# Patient Record
Sex: FEMALE | Race: BLACK OR AFRICAN AMERICAN | Employment: OTHER | URBAN - METROPOLITAN AREA
[De-identification: names, ages, dates, MRNs, and addresses within clinical notes are randomized per-mention and may not be internally consistent; named-entity substitution may affect disease eponyms.]

---

## 2020-08-09 ENCOUNTER — HOSPITAL ENCOUNTER (INPATIENT)
Age: 60
LOS: 3 days | Discharge: HOME HEALTH | DRG: 433 | End: 2020-08-12
Attending: HOSPITALIST | Admitting: FAMILY MEDICINE
Payer: MEDICARE

## 2020-08-09 DIAGNOSIS — F11.10 HEROIN ABUSE (HCC): Primary | ICD-10-CM

## 2020-08-09 PROBLEM — K72.90 LIVER FAILURE (HCC): Status: ACTIVE | Noted: 2020-08-09

## 2020-08-09 PROCEDURE — 74011250637 HC RX REV CODE- 250/637: Performed by: FAMILY MEDICINE

## 2020-08-09 PROCEDURE — 74011000250 HC RX REV CODE- 250: Performed by: FAMILY MEDICINE

## 2020-08-09 PROCEDURE — 65270000029 HC RM PRIVATE

## 2020-08-09 PROCEDURE — 74011250636 HC RX REV CODE- 250/636: Performed by: FAMILY MEDICINE

## 2020-08-09 RX ORDER — PANTOPRAZOLE SODIUM 40 MG/1
40 TABLET, DELAYED RELEASE ORAL 2 TIMES DAILY
Status: DISCONTINUED | OUTPATIENT
Start: 2020-08-09 | End: 2020-08-12 | Stop reason: HOSPADM

## 2020-08-09 RX ORDER — METHADONE HYDROCHLORIDE 10 MG/1
5 TABLET ORAL 3 TIMES DAILY
Status: DISCONTINUED | OUTPATIENT
Start: 2020-08-09 | End: 2020-08-12 | Stop reason: HOSPADM

## 2020-08-09 RX ORDER — PROMETHAZINE HYDROCHLORIDE 25 MG/1
12.5 TABLET ORAL
Status: DISCONTINUED | OUTPATIENT
Start: 2020-08-09 | End: 2020-08-09

## 2020-08-09 RX ORDER — ACETAMINOPHEN 650 MG/1
650 SUPPOSITORY RECTAL
Status: DISCONTINUED | OUTPATIENT
Start: 2020-08-09 | End: 2020-08-09

## 2020-08-09 RX ORDER — FUROSEMIDE 40 MG/1
40 TABLET ORAL DAILY
Status: DISCONTINUED | OUTPATIENT
Start: 2020-08-10 | End: 2020-08-10

## 2020-08-09 RX ORDER — LORAZEPAM 1 MG/1
1 TABLET ORAL
Status: DISCONTINUED | OUTPATIENT
Start: 2020-08-09 | End: 2020-08-12 | Stop reason: HOSPADM

## 2020-08-09 RX ORDER — SODIUM CHLORIDE 0.9 % (FLUSH) 0.9 %
5-40 SYRINGE (ML) INJECTION EVERY 8 HOURS
Status: DISCONTINUED | OUTPATIENT
Start: 2020-08-09 | End: 2020-08-12 | Stop reason: HOSPADM

## 2020-08-09 RX ORDER — SODIUM CHLORIDE 0.9 % (FLUSH) 0.9 %
5-40 SYRINGE (ML) INJECTION AS NEEDED
Status: DISCONTINUED | OUTPATIENT
Start: 2020-08-09 | End: 2020-08-12 | Stop reason: HOSPADM

## 2020-08-09 RX ORDER — POLYETHYLENE GLYCOL 3350 17 G/17G
17 POWDER, FOR SOLUTION ORAL DAILY PRN
Status: DISCONTINUED | OUTPATIENT
Start: 2020-08-09 | End: 2020-08-12 | Stop reason: HOSPADM

## 2020-08-09 RX ORDER — SPIRONOLACTONE 100 MG/1
100 TABLET, FILM COATED ORAL DAILY
Status: DISCONTINUED | OUTPATIENT
Start: 2020-08-10 | End: 2020-08-10

## 2020-08-09 RX ORDER — ONDANSETRON 2 MG/ML
4 INJECTION INTRAMUSCULAR; INTRAVENOUS
Status: DISCONTINUED | OUTPATIENT
Start: 2020-08-09 | End: 2020-08-12 | Stop reason: HOSPADM

## 2020-08-09 RX ORDER — ACETAMINOPHEN 325 MG/1
650 TABLET ORAL
Status: DISCONTINUED | OUTPATIENT
Start: 2020-08-09 | End: 2020-08-09

## 2020-08-09 RX ADMIN — Medication 10 ML: at 22:40

## 2020-08-09 RX ADMIN — LORAZEPAM 1 MG: 1 TABLET ORAL at 22:39

## 2020-08-09 RX ADMIN — CEFTRIAXONE 1 G: 1 INJECTION, POWDER, FOR SOLUTION INTRAMUSCULAR; INTRAVENOUS at 22:39

## 2020-08-09 RX ADMIN — METHADONE HYDROCHLORIDE 5 MG: 10 TABLET ORAL at 22:39

## 2020-08-09 RX ADMIN — PANTOPRAZOLE SODIUM 40 MG: 40 TABLET, DELAYED RELEASE ORAL at 22:39

## 2020-08-09 NOTE — PROGRESS NOTES
1721  TRANSFER - IN REPORT:    Verbal report received from [address], RN(name) on Evan Zamora  being received from [facility] Hemo/Oncology(unit) for routine progression of care      Report consisted of patients Situation, Background, Assessment and   Recommendations(SBAR). Information from the following report(s) SBAR, Procedure Summary, Intake/Output, MAR and Recent Results was reviewed with the receiving nurse. Opportunity for questions and clarification was provided. Laura Ellison Lavon, 62 yr old, female  Upper GI bleeding   EGD banding by Dr. Vera Avitia at [facility] on 08/05/20  Transfer for TIPS procedure at [facility]    Hx of Cirrohosis, Hepatitis C, Heroine abuse. Full code, No known allergies, Full Liquid diet. Abd soft, LBM unknown,   Skin intact, no swallowing issue,  Lung clear, SpO2 100% on room air,  NSR, bradycardic (HR 58-64), Last /67,   A&Ox4, Up & dileep, bedside commode,  PICC triple lumen, currently running Octreotide 50 mg/hr (10 mL/hr),    K 3.2 - received 30 mEq IV today,  Mg 1.5  H & H  8.6 / 25.3     Will be picked up by a transport on 1800.    1916  Pt is in unit 3S, Rm 316  JOSE MIGUEL Ellison RN is admitting pt.

## 2020-08-10 ENCOUNTER — HOSPITAL ENCOUNTER (OUTPATIENT)
Dept: ULTRASOUND IMAGING | Age: 60
Discharge: HOME | DRG: 433 | End: 2020-08-10
Attending: FAMILY MEDICINE
Payer: MEDICARE

## 2020-08-10 ENCOUNTER — APPOINTMENT (OUTPATIENT)
Dept: NON INVASIVE DIAGNOSTICS | Age: 60
DRG: 433 | End: 2020-08-10
Attending: FAMILY MEDICINE
Payer: MEDICARE

## 2020-08-10 PROBLEM — D64.9 ANEMIA: Status: ACTIVE | Noted: 2020-08-10

## 2020-08-10 PROBLEM — I85.00 ESOPHAGEAL VARICES (HCC): Status: ACTIVE | Noted: 2020-08-10

## 2020-08-10 PROBLEM — E87.6 HYPOKALEMIA: Status: ACTIVE | Noted: 2020-08-10

## 2020-08-10 PROBLEM — F11.10 HEROIN ABUSE (HCC): Status: ACTIVE | Noted: 2020-08-10

## 2020-08-10 PROBLEM — D69.6 THROMBOCYTOPENIA (HCC): Status: ACTIVE | Noted: 2020-08-10

## 2020-08-10 PROBLEM — F43.10 PTSD (POST-TRAUMATIC STRESS DISORDER): Status: ACTIVE | Noted: 2020-08-10

## 2020-08-10 PROBLEM — Z78.9 ALCOHOL USE: Status: ACTIVE | Noted: 2020-08-10

## 2020-08-10 PROBLEM — K74.60 CIRRHOSIS OF LIVER (HCC): Status: ACTIVE | Noted: 2020-08-10

## 2020-08-10 LAB
ABO + RH BLD: NORMAL
ALBUMIN SERPL-MCNC: 2.1 G/DL (ref 3.4–5)
ALBUMIN/GLOB SERPL: 0.6 {RATIO} (ref 0.8–1.7)
ALP SERPL-CCNC: 86 U/L (ref 45–117)
ALT SERPL-CCNC: 14 U/L (ref 13–56)
AMMONIA PLAS-SCNC: 55 UMOL/L (ref 11–32)
ANION GAP SERPL CALC-SCNC: 4 MMOL/L (ref 3–18)
AST SERPL-CCNC: 39 U/L (ref 10–38)
AV PEAK GRADIENT: 36.39 MMHG
AV VELOCITY RATIO: 0.56
AV VTI RATIO: 0.6
BILIRUB SERPL-MCNC: 7 MG/DL (ref 0.2–1)
BLOOD GROUP ANTIBODIES SERPL: NORMAL
BUN SERPL-MCNC: 4 MG/DL (ref 7–18)
BUN/CREAT SERPL: 5 (ref 12–20)
CALCIUM SERPL-MCNC: 7.4 MG/DL (ref 8.5–10.1)
CHLORIDE SERPL-SCNC: 106 MMOL/L (ref 100–111)
CO2 SERPL-SCNC: 29 MMOL/L (ref 21–32)
CREAT SERPL-MCNC: 0.88 MG/DL (ref 0.6–1.3)
ECHO AR MAX VEL PISA: 302 CM/S
ECHO AV ANNULUS DIAM: 2.53 CM
ECHO AV AREA PEAK VELOCITY: 1.9 CM2
ECHO AV AREA VTI: 2 CM2
ECHO AV AREA/BSA PEAK VELOCITY: 0.9 CM2/M2
ECHO AV AREA/BSA VTI: 1 CM2/M2
ECHO AV MEAN GRADIENT: 7.9 MMHG
ECHO AV MEAN VELOCITY: 1.34 M/S
ECHO AV PEAK GRADIENT: 14.5 MMHG
ECHO AV PEAK VELOCITY: 190.44 CM/S
ECHO AV REGURGITANT PHT: 1545.4 CM
ECHO AV VTI: 43.28 CM
ECHO EST RA PRESSURE: 5 MMHG
ECHO IVC PROX: 0.8 CM
ECHO LA AREA 2C: 22.08 CM2
ECHO LA AREA 4C: 27.1 CM2
ECHO LA MAJOR AXIS: 3.94 CM
ECHO LA MINOR AXIS: 1.95 CM
ECHO LA VOL 2C: 70.55 ML (ref 22–52)
ECHO LA VOL 4C: 86.35 ML (ref 22–52)
ECHO LA VOL BP: 87.61 ML (ref 22–52)
ECHO LA VOLUME INDEX A2C: 34.97 ML/M2 (ref 16–28)
ECHO LA VOLUME INDEX A4C: 42.8 ML/M2 (ref 16–28)
ECHO LV E' LATERAL VELOCITY: 8 CM/S
ECHO LV E' SEPTAL VELOCITY: 6 CM/S
ECHO LV EDV A2C: 81.3 ML
ECHO LV EDV A4C: 61.5 ML
ECHO LV EDV BP: 73.5 ML (ref 56–104)
ECHO LV EDV INDEX A4C: 30.5 ML/M2
ECHO LV EDV INDEX BP: 36.4 ML/M2
ECHO LV EDV NDEX A2C: 40.3 ML/M2
ECHO LV EDV TEICHHOLZ: 0.5 ML
ECHO LV EJECTION FRACTION A2C: 69 %
ECHO LV EJECTION FRACTION A4C: 69 %
ECHO LV EJECTION FRACTION BIPLANE: 69.2 % (ref 55–100)
ECHO LV ESV A2C: 25.6 ML
ECHO LV ESV A4C: 19.1 ML
ECHO LV ESV BP: 22.7 ML (ref 19–49)
ECHO LV ESV INDEX A2C: 12.7 ML/M2
ECHO LV ESV INDEX A4C: 9.5 ML/M2
ECHO LV ESV INDEX BP: 11.3 ML/M2
ECHO LV ESV TEICHHOLZ: 0.19 ML
ECHO LV INTERNAL DIMENSION DIASTOLIC: 4.45 CM (ref 3.9–5.3)
ECHO LV INTERNAL DIMENSION SYSTOLIC: 2.96 CM
ECHO LV IVSD: 1.3 CM (ref 0.6–0.9)
ECHO LV MASS 2D: 172 G (ref 67–162)
ECHO LV MASS INDEX 2D: 85.2 G/M2 (ref 43–95)
ECHO LV POSTERIOR WALL DIASTOLIC: 0.9 CM (ref 0.6–0.9)
ECHO LV POSTERIOR WALL SYSTOLIC: 0 CM
ECHO LVOT CARDIAC OUTPUT: 16.1 L/MIN
ECHO LVOT DIAM: 2.1 CM
ECHO LVOT PEAK GRADIENT: 4.5 MMHG
ECHO LVOT PEAK VELOCITY: 106.22 CM/S
ECHO LVOT SV: 86 ML
ECHO LVOT VTI: 24.88 CM
ECHO MV A VELOCITY: 111.4 CM/S
ECHO MV AREA PHT: 3.2 CM2
ECHO MV E DECELERATION TIME (DT): 233.5 MS
ECHO MV E VELOCITY: 90.38 CM/S
ECHO MV E/A RATIO: 0.81
ECHO MV E/E' LATERAL: 11.3
ECHO MV E/E' RATIO (AVERAGED): 13.18
ECHO MV E/E' SEPTAL: 15.06
ECHO MV PRESSURE HALF TIME (PHT): 67.7 MS
ECHO RA AREA 4C: 18.12 CM2
ECHO RA VOLUME: 50 ML
ECHO RV INTERNAL DIMENSION: 3.06 CM
ECHO RV TAPSE: 2.5 CM (ref 1.5–2)
ECHO TV REGURGITANT MAX VELOCITY: 240.74 CM/S
ECHO TV REGURGITANT PEAK GRADIENT: 23.2 MMHG
ERYTHROCYTE [DISTWIDTH] IN BLOOD BY AUTOMATED COUNT: 23.6 % (ref 11.6–14.5)
GLOBULIN SER CALC-MCNC: 3.8 G/DL (ref 2–4)
GLUCOSE SERPL-MCNC: 110 MG/DL (ref 74–99)
HCT VFR BLD AUTO: 25.3 % (ref 35–45)
HGB BLD-MCNC: 8 G/DL (ref 12–16)
HIV 1+2 AB+HIV1 P24 AG SERPL QL IA: NONREACTIVE
HIV12 RESULT COMMENT, HHIVC: NORMAL
INR PPP: 1.9 (ref 0.8–1.2)
LVFS 2D: 33.58 %
LVOT MG: 2.32 MMHG
LVOT MV: 0.7 CM/S
LVSV (MOD BI): 24.26 ML
LVSV (MOD SINGLE 4C): 20.19 ML
LVSV (MOD SINGLE): 26.56 ML
LVSV (TEICH): 26.83 ML
MCH RBC QN AUTO: 28.2 PG (ref 24–34)
MCHC RBC AUTO-ENTMCNC: 31.6 G/DL (ref 31–37)
MCV RBC AUTO: 89.1 FL (ref 74–97)
MV DEC SLOPE: 3.87
PISA AR MAX VEL: 301.6 CM/S
PLATELET # BLD AUTO: 27 K/UL (ref 135–420)
POTASSIUM SERPL-SCNC: 3.6 MMOL/L (ref 3.5–5.5)
PROT SERPL-MCNC: 5.9 G/DL (ref 6.4–8.2)
PROTHROMBIN TIME: 21.3 SEC (ref 11.5–15.2)
RBC # BLD AUTO: 2.84 M/UL (ref 4.2–5.3)
SODIUM SERPL-SCNC: 139 MMOL/L (ref 136–145)
SPECIMEN EXP DATE BLD: NORMAL
WBC # BLD AUTO: 3.6 K/UL (ref 4.6–13.2)

## 2020-08-10 PROCEDURE — 85027 COMPLETE CBC AUTOMATED: CPT

## 2020-08-10 PROCEDURE — 76978 US TRGT DYN MBUBB 1ST LES: CPT

## 2020-08-10 PROCEDURE — 74011250637 HC RX REV CODE- 250/637: Performed by: FAMILY MEDICINE

## 2020-08-10 PROCEDURE — 82140 ASSAY OF AMMONIA: CPT

## 2020-08-10 PROCEDURE — 74011250636 HC RX REV CODE- 250/636: Performed by: FAMILY MEDICINE

## 2020-08-10 PROCEDURE — 85610 PROTHROMBIN TIME: CPT

## 2020-08-10 PROCEDURE — 36430 TRANSFUSION BLD/BLD COMPNT: CPT

## 2020-08-10 PROCEDURE — 65270000029 HC RM PRIVATE

## 2020-08-10 PROCEDURE — 93306 TTE W/DOPPLER COMPLETE: CPT

## 2020-08-10 PROCEDURE — 30233N1 TRANSFUSION OF NONAUTOLOGOUS RED BLOOD CELLS INTO PERIPHERAL VEIN, PERCUTANEOUS APPROACH: ICD-10-PCS | Performed by: HOSPITALIST

## 2020-08-10 PROCEDURE — 80053 COMPREHEN METABOLIC PANEL: CPT

## 2020-08-10 PROCEDURE — 86900 BLOOD TYPING SEROLOGIC ABO: CPT

## 2020-08-10 PROCEDURE — 87389 HIV-1 AG W/HIV-1&-2 AB AG IA: CPT

## 2020-08-10 PROCEDURE — P9047 ALBUMIN (HUMAN), 25%, 50ML: HCPCS | Performed by: FAMILY MEDICINE

## 2020-08-10 PROCEDURE — P9035 PLATELET PHERES LEUKOREDUCED: HCPCS

## 2020-08-10 PROCEDURE — 74011000250 HC RX REV CODE- 250: Performed by: FAMILY MEDICINE

## 2020-08-10 PROCEDURE — 87902 NFCT AGT GNTYP ALYS HEP C: CPT

## 2020-08-10 PROCEDURE — 87522 HEPATITIS C REVRS TRNSCRPJ: CPT

## 2020-08-10 RX ORDER — ALBUMIN HUMAN 250 G/1000ML
25 SOLUTION INTRAVENOUS EVERY 6 HOURS
Status: DISCONTINUED | OUTPATIENT
Start: 2020-08-10 | End: 2020-08-11

## 2020-08-10 RX ORDER — SPIRONOLACTONE 100 MG/1
200 TABLET, FILM COATED ORAL DAILY
Status: DISCONTINUED | OUTPATIENT
Start: 2020-08-11 | End: 2020-08-12 | Stop reason: HOSPADM

## 2020-08-10 RX ORDER — FUROSEMIDE 40 MG/1
80 TABLET ORAL DAILY
Status: DISCONTINUED | OUTPATIENT
Start: 2020-08-11 | End: 2020-08-12 | Stop reason: HOSPADM

## 2020-08-10 RX ORDER — SODIUM CHLORIDE 9 MG/ML
250 INJECTION, SOLUTION INTRAVENOUS AS NEEDED
Status: DISCONTINUED | OUTPATIENT
Start: 2020-08-10 | End: 2020-08-12 | Stop reason: HOSPADM

## 2020-08-10 RX ADMIN — METHADONE HYDROCHLORIDE 5 MG: 10 TABLET ORAL at 10:17

## 2020-08-10 RX ADMIN — ALBUMIN (HUMAN) 25 G: 0.25 INJECTION, SOLUTION INTRAVENOUS at 21:00

## 2020-08-10 RX ADMIN — ALBUMIN (HUMAN) 25 G: 0.25 INJECTION, SOLUTION INTRAVENOUS at 10:11

## 2020-08-10 RX ADMIN — SULFUR HEXAFLUORIDE 2.4 ML: KIT at 13:00

## 2020-08-10 RX ADMIN — Medication 10 ML: at 21:00

## 2020-08-10 RX ADMIN — FUROSEMIDE 40 MG: 40 TABLET ORAL at 10:17

## 2020-08-10 RX ADMIN — METHADONE HYDROCHLORIDE 5 MG: 10 TABLET ORAL at 21:00

## 2020-08-10 RX ADMIN — PANTOPRAZOLE SODIUM 40 MG: 40 TABLET, DELAYED RELEASE ORAL at 10:17

## 2020-08-10 RX ADMIN — Medication 10 ML: at 14:06

## 2020-08-10 RX ADMIN — ALBUMIN (HUMAN) 25 G: 0.25 INJECTION, SOLUTION INTRAVENOUS at 15:57

## 2020-08-10 RX ADMIN — CEFTRIAXONE 1 G: 1 INJECTION, POWDER, FOR SOLUTION INTRAMUSCULAR; INTRAVENOUS at 21:00

## 2020-08-10 RX ADMIN — PANTOPRAZOLE SODIUM 40 MG: 40 TABLET, DELAYED RELEASE ORAL at 20:59

## 2020-08-10 RX ADMIN — SPIRONOLACTONE 100 MG: 100 TABLET ORAL at 10:17

## 2020-08-10 RX ADMIN — LORAZEPAM 1 MG: 1 TABLET ORAL at 21:11

## 2020-08-10 RX ADMIN — METHADONE HYDROCHLORIDE 5 MG: 10 TABLET ORAL at 15:56

## 2020-08-10 RX ADMIN — ALBUMIN (HUMAN) 25 G: 0.25 INJECTION, SOLUTION INTRAVENOUS at 03:27

## 2020-08-10 NOTE — ROUTINE PROCESS
1912  Bedside and Verbal shift change report given to Darrell Acosta RN (oncoming nurse) by Ra Doshi RN (offgoing nurse). Report included the following information SBAR, Kardex and MAR.

## 2020-08-10 NOTE — PROGRESS NOTES
8932   Pt is awake, alert, oriented x4. Seen by Dr Zulema Rodriguez. Echo   being done after Dr Zulema Rodriguez left. As per MD, pt can resume regular diet. Pt will have US of abdomen and/or Paracentecis late in the afternoon. Will let RN know if pt will need to be NPO.  1024   Pt ambulated in BR. Lungs are clear. Abdomen slightly distended with + BS.    1134   IDR done . 1219   1 unit Platelets started  For platelet level 27. Pt went down for US abd limited and Paracentesis. via bed accompanied by Tony Ramsey RN to check on platelets infusion. .  RN gave report regarding Platelet transfusion to Tony Ramsey RN. .   1345   Ultrasound of abd was done. Pt did not have enough fluids to  Do a Paracentecis so procedure was cancelled. Pt  Is back to room via bed. Platelets transfusion in progress. 1403   Pt's Transfusion  completed without any untoward reaction. 1602   Pt is awake, alert, oriented x4. Resting in bed. Pt has pain in her abdomen  With pain level 5/10. Took scheduled dose of Methadone. 1800   Eating dinner.

## 2020-08-10 NOTE — ROUTINE PROCESS
Bedside and Verbal shift change report given to Jim Arechiga RN (oncoming nurse) by F. Devota Hashimoto, RN (offgoing nurse). Report included the following information SBAR, Kardex, Intake/Output, MAR and Recent Results.

## 2020-08-10 NOTE — PROGRESS NOTES
Accompanied pt to and from ultrasound dept to monitor ongoing platelet transfusion during radiology exam. Pt returned to inpt room in no acute distress and without changes to baseline status. Tahira Winn RN assumed care and platelet transfusion. Statement Selected

## 2020-08-10 NOTE — H&P
History & Physical    Patient: Homero Benito MRN: [id number]  CSN: [id number]    YOB: 1960  Age: 62 y.o. Sex: female      DOA: 8/9/2020  Primary Care Provider:  Other, MD Gretchen      Assessment/Plan     Patient Active Problem List   Diagnosis Code    Liver failure ([facility] K72.90    Anemia D64.9    Thrombocytopenia ([facility] D69.6    Esophageal varices (HCC) I85.00    Hepatitis C B19.20    Alcohol use Z72.89    Heroin abuse ([facility] F11.10    Cirrhosis of liver ([facility] K74.60    PTSD (post-traumatic stress disorder) F43.10    Hypokalemia E87.6     63 y/o female with cirrhosis due to hepatitis C and EtOH use, liver failure, and esophageal varices transferred from [place] for TIPS procedure and hepatology consultation.     Liver failure-MELD score of 17.2 based on last [place] labs  -hepatology consult (Dr. Jaimie Barker aware of transfer)  -lasix 40 mg daily  -spironolactone 100 mg daily  -albumin infusions  -plan for TIPS procedure  -echo in the morning to evaluate EF  -check ammonia level  -continue rocephin 1 g daily for SBP prophylaxis  -RUQ ultrasound (there was mention of cholelithiasis with stones near the gallbladder neck on abdominal CT)    Esophageal varices-s/p banding at [place]  -monitor H/H  -will need screening EGD as per hepatology  -protonix BID    Thrombocytopenia-due to liver failure  -avoid anticoagulation  -follow CBC    Hepatitis C-likely due to IV drug use, has not received treatment  -check hepatitis C viral load  -check HIV test    Cirrhosis of liver-due to hepatitis C and EtOH use    Hypokalemia  -monitor K closely    Heroin abuse (intranasal)-recommend complete abstinence  -methadone TID    Alcohol abuse-recommend complete abstinence    Anemia-stable post variceal banding at [place]  -trend H/H    Full code    Prophylaxis: SCDs, protonix IV, no AC due to thrombocytopenia    Estimated length of stay : 5 nights    [facility] MD Sanchez  ---------------------------------------------------------------------------------------------------------------------------------------------------------  CC: transfer for TIPS/hepatology       HPI:     Homero Benito is a 62 y.o. female who has ongoing intranasal heroin abuse and EtOH use (one beer per week) who was treated at the [place] last week for a CHF exacerbation (she had swelling in her legs and shortness of breath). After she was discharged, she developed acute upper GI bleeding and was admitted to [facility], where she was found to be in liver failure. She did not have paracentesis during her admission there. She was transferred to THE [facility] for TIPS procedure and hepatology care. She denies chest pain, shortness of breath, or bleeding at this time. Per [place] discharge summary 8/9/20:  Hospitalization course:  Mrs. Doll was admitted to [facility] under the impression of acute upper GI bleed secondary to esophageal varices,decompensated cirrhosis and thrombocytopenia. She was seen and evaluated by Gastroenterology and underwent an EGD with banding EGD demonstrated varices with red David signs, mild [place] gastropathy, duodenal atrophy. The patient has required p.r.n. platelets and 2 units of PRBCs throughout her hospitalization. In light of the fact that she needs a tips procedure she be transferred to a facility capable of performing the procedure. Past Medical History:   Diagnosis Date    Alcohol use     Bipolar 2 disorder ([facility]     Hepatitis C     Heroin abuse ([facility]     PTSD (post-traumatic stress disorder)        History reviewed. No pertinent surgical history.     Family History   Problem Relation Age of Onset    Heart Disease Mother     Cancer Father        Social History     Socioeconomic History    Marital status: SINGLE     Spouse name: Not on file    Number of children: Not on file    Years of education: Not on file    Highest education level: Not on file   Other Topics Concern       Prior to Admission medications    Not on File       No Known Allergies    Review of Systems  Gen: No fever, chills, malaise, +weight gain due to fluid. Heent: No headache, rhinorrhea, sore throat. Heart: No chest pain, palpitations, VELASQUEZ, pnd, or orthopnea. Resp: No cough, hemoptysis, wheezing and shortness of breath. GI: No nausea, vomiting, diarrhea, constipation, melena or hematochezia. : No urinary obstruction, dysuria or hematuria. Derm: No rash, new skin lesion or pruritis. Musc/skeletal: no bone or joint complaints. Vasc: No edema, cyanosis or claudication. Endo: No heat/cold intolerance, no polyuria,polydipsia or polyphagia. Neuro: No unilateral weakness, numbness, tingling. No seizures. Heme: +recent bleeding          Physical Exam:     Physical Exam:  Visit Vitals  /61 (BP 1 Location: Left arm, BP Patient Position: At rest)   Pulse 64   Temp 98.2 °F (36.8 °C)   Resp 16   Wt 88.4 kg (194 lb 12.8 oz)   SpO2 98%           Temp (24hrs), Av.2 °F (36.8 °C), Min:98.1 °F (36.7 °C), Max:98.2 °F (36.8 °C)    No intake/output data recorded. No intake/output data recorded. General:  Awake, cooperative, no distress. Head:  Normocephalic, without obvious abnormality, atraumatic. Eyes:  Conjunctivae/corneas clear, sclera icteric. Neck: Supple, symmetrical, trachea midline. Lungs:   Clear to auscultation bilaterally. Heart:  Regular rate and rhythm, S1, S2 normal, no murmur, click, rub or gallop. Abdomen: Soft, distended, tender to palpation in the epigastric region. No rebound tenderness. Bowel sounds normal.   Extremities: Extremities normal, atraumatic, no cyanosis or edema. Capillary refill normal.   Pulses: 2+ and symmetric all extremities. Skin: Skin color pink, turgor normal. No rashes or lesions   Neurologic: No focal motor or sensory deficit.        Labs Reviewed: see care everywhere for [facility] labs    All lab results for the last 24 hours reviewed. No results found for this or any previous visit (from the past 24 hour(s)).

## 2020-08-10 NOTE — PROGRESS NOTES
Hospitalist Progress Note    Patient: Bridger Can MRN: [id number]  CSN: [id number]    YOB: 1960  Age: 62 y.o. Sex: female    DOA: 8/9/2020 LOS:  LOS: 1 day          Chief Complaint:    Liver failure      Assessment/Plan     Liver failure  Appreciate hepatology consult  She has cirrhosis from hep C and likely etoh abuse  She also abuses heroin intransally  At this juncture TIPS not recommended, no lactulose as not encephalopathic, and Liver US will be done  Hep C tx options in discussion    Esophageal varices-s/p banding at [facility] daily  Repeat EGD next month per hepatology  PO protonix BID     Thrombocytopenia-due to liver failure  No blood thinners  Platelet transfusion today    Coagulopathy due to liver cirrhosis     Hepatitis C-likely due to immunizations in 1970's per hepatology     Cirrhosis of liver-due to hepatitis C and EtOH use     Heroin abuse (intranasal)-counselled dangers of continued use  methadone TID     Alcohol abuse-CIWA protocol, but has not drank since being hospitalized now over 5 days     Anemia-stable post variceal banding at [facility] she can d/c soon if no interventions planned outside of today    Disposition :  Patient Active Problem List   Diagnosis Code    Liver failure ([facility] K72.90    Anemia D64.9    Thrombocytopenia ([facility] D69.6    Esophageal varices (HCC) I85.00    Hepatitis C B19.20    Alcohol use Z72.89    Heroin abuse ([facility] F11.10    Cirrhosis of liver ([facility] K74.60    PTSD (post-traumatic stress disorder) F43.10    Hypokalemia E87.6       Subjective:    I am ok  Denies shakes, tremors  Denies nausea.  vomiting    Review of systems:    Constitutional: denies fevers, chills  Respiratory: denies SOB, cough  Cardiovascular: denies chest pain, palpitations  Gastrointestinal: denies  diarrhea      Vital signs/Intake and Output:  Visit Vitals  /61 (BP 1 Location: Left arm, BP Patient Position: At rest)   Pulse 67   Temp 98.3 °F (36.8 °C)   Resp 16   Wt 88.4 kg (194 lb 12.8 oz)   SpO2 98%     Current Shift:  No intake/output data recorded. Last three shifts:  No intake/output data recorded. Exam:    General: elderly appearing AAF alert, NAD, OX3  Head/Neck: scleral icterus  CVS:Regular rate and rhythm, no M/R/G, S1/S2 heard, no thrill  Lungs:Clear to auscultation bilaterally, no wheezes, rhonchi, or rales  Abdomen: Soft, Nontender, No distention, Normal Bowel sounds, No hepatomegaly  Extremities: No C/C/E, pulses palpable 2+  Neuro:grossly normal , follows commands  Psych:appropriate                Labs: Results:       Chemistry Recent Labs     08/10/20  0415   *      K 3.6      CO2 29   BUN 4*   CREA 0.88   CA 7.4*   AGAP 4   BUCR 5*   AP 86   TP 5.9*   ALB 2.1*   GLOB 3.8   AGRAT 0.6*      CBC w/Diff Recent Labs     08/10/20  0415   WBC 3.6*   RBC 2.84*   HGB 8.0*   HCT 25.3*   PLT 27*      Cardiac Enzymes No results for input(s): CPK, CKND1, ABIOLA in the last 72 hours. No lab exists for component: CKRMB, TROIP   Coagulation Recent Labs     08/10/20  0415   PTP 21.3*   INR 1.9*       Lipid Panel No results found for: CHOL, CHOLPOCT, CHOLX, CHLST, CHOLV, 150686, HDL, HDLP, LDL, LDLC, DLDLP, 498677, VLDLC, VLDL, TGLX, TRIGL, TRIGP, TGLPOCT, CHHD, CHHDX   BNP No results for input(s): BNPP in the last 72 hours.    Liver Enzymes Recent Labs     08/10/20  0415   TP 5.9*   ALB 2.1*   AP 86      Thyroid Studies No results found for: T4, T3U, TSH, TSHEXT     Procedures/imaging: see electronic medical records for all procedures/Xrays and details which were not copied into this note but were reviewed prior to creation of Jemima Ribera MD

## 2020-08-10 NOTE — PROGRESS NOTES
2300-Verbal report received from [facility]. Sbar, mar, labs, kardex and patient status reviewed. Per report patient appropriate for medical and does not need ICU level care. Noted no order to transfer to ICU. Called Dr. Juan Stone to clarify patient placement without transfer orders to ICU.     Kirtjames Carolann returned page. Pt to stay on medical unit.

## 2020-08-10 NOTE — PROGRESS NOTES
Problem: Falls - Risk of  Goal: *Absence of Falls  Description: Document Anh Cordova Fall Risk and appropriate interventions in the flowsheet.   Outcome: Progressing Towards Goal  Note: Fall Risk Interventions:  Mobility Interventions: Utilize walker, cane, or other assistive device, Patient to call before getting OOB    Mentation Interventions: Door open when patient unattended    Medication Interventions: Patient to call before getting OOB

## 2020-08-10 NOTE — PROGRESS NOTES
2001 - Head to toe assessment completed at this time. Pt denies any chest pain or SOB. Pt denies any unusual numbness or tingling to extremities. Pt skin intact and bowel sounds are hypoactive. Pt lungs sounds clear bilaterally and  equal bilaterally. Pt denies any chest pain and is alert and oriented times four. Pt armband applied at this time and educated on the use of the call light. Pt encouraged to call for assistance with the use of the restroom. Pt left in bed with call light within reach, bed in the low position, and wheels locked. Will continue to monitor. 2320 - Informed that pt will remain on the unit and will not be transferred. 2261 - Critical lab results with platelets being 27.    0502 - Dr. Juliana Sanderson informed of platelets and stated that she would or transfusion. Shift summary - Pt had an uneventful night.

## 2020-08-10 NOTE — ROUTINE PROCESS
2300 - TRANSFER - OUT REPORT:    Verbal report given to KESHA Okeefe RN(name) on Pema Mask  being transferred to ICU(unit) for routine progression of care       Report consisted of patients Situation, Background, Assessment and   Recommendations(SBAR). Information from the following report(s) SBAR, Kardex, Intake/Output, MAR and Recent Results was reviewed with the receiving nurse. Lines:       Opportunity for questions and clarification was provided.       Patient transported with:   Registered Nurse

## 2020-08-10 NOTE — PROGRESS NOTES
Problem: Falls - Risk of  Goal: *Absence of Falls  Description: Document Katty Nix Fall Risk and appropriate interventions in the flowsheet.   Outcome: Progressing Towards Goal  Note: Fall Risk Interventions:  Mobility Interventions: Utilize walker, cane, or other assistive device, Patient to call before getting OOB         Medication Interventions: Assess postural VS orthostatic hypotension, Patient to call before getting OOB, Teach patient to arise slowly

## 2020-08-10 NOTE — CONSULTS
[address], MD, Tj, Pippa Bolaños, MD John Bradford, NORMA Law, [facility]     Davina Novak, [facility]   Doug Miller, [facility]    Joe Corrales, [facility]       Zonia Biggs Andrew De Nath 136    at [address], [address] [place] [address], Rafaela  22.    [phone]    FAX: [address] [place] [address], [address]    [phone]    FAX: [phone]         HEPATOLOGY CONSULT NOTE  I was asked to see this patient in consultation by Dr Dilia Alvarez for management of cirrhosis and ascites. I have reviewed the Emergency room note, Hospital admission note, Notes by all other physicians who have seen the patient during this hospitalization to date. I have reviewed the problem list and the reason for this hospitalization. I have reviewed the allergies and the medications the patient was taking at home prior to this hospitalization. HISTORY:  The patient is a 62year old female with cirrhosis secondary to chronic HCV. She probably acquired HCV from mass vaccination after enrolling in the [employer] in the 1970s. She was found to have HCV in the 1990s. She was never treated for HCV. She found out she had cirrhosis 1 week ago when she had hematemesis and She was taken to [facility] and underwent EGD with banding of esophageal varices. She developed ascites and HE    I was called about her on Thursday with request to transfer for emergency TIPS. [facility] and she was finally transfered yesterday. IN her hospital room this AM she is alert and oriented. Some ascites is present. There is no edema.   Labs are significnat for TBILI 7.0, INR 1.9, Scr 0.88, Sna 139, HB 8.0, PLT 27,      PMH:  Knee surgery ASSESSMENT AND PLAN:  Cirrhosis  Cirrhosis is secondary to chronic HCV. The diagnosis of cirrhosis is based upon imaging, laboratory studies, complications of cirrhosis. The CTP is 11. Child class C. The MELD score is 21. Chronic HCV   Chronic HCV with cirrhosis. She probably acquired HCV in the 1970s with mass vaccination when she enlisted in the Counts include 234 beds at the [facility]. She found out she had HCV in the 1990s. She was never treated for HCV. She found out she had cirrhosis in 8/2020    Liver transaminases are normal.  ALP is normal.  Total bilirubin is elevated. Serum albumin is depressed. INR is prolonged. The platelet count is depressed. The last HCV RNA was log 5.12 intU in 5/2019. HCV genotype has not been defined. Serologic testing for HBV is negative. Will perform imaging of the liver with ultrasound and duplex of HV and PV. Chronic HCV Treatment  The patient has not been treated for HCV. The patient has HCV genotype that is not yet defined. Discussed the treatment alternatives. The SVR/cure rate for HCV now exceeds 97% without significant side effects for most patients with HCV. The specific treatment is dependent upon genotype, viral load and histology. The patient could be treated with Epclusa (sofosbuvir and valpitasvir) for 12 weeks. The SVR/cure rate for is about 90% because she has decompensated cirrhosis. Alternatively she has a MELD score of 21 and if she is a candidate for liver transplant she could be treated for HCV after the liver transplant. THis would increase her chance for SVR/cayetano to 98%,    Ascites   Ascites has developed for thje first time in 8/2020 after variceal bleeding. Ascites is still present on the current dose of diuretics step 1. Scr and Sna are normal and so we will increase diuretics to step 2. There is no indication for TIPS at this time. Paracentesis will be perfomred today. Analyze ascites for albumin and cell count.     The patient was counseled regarding the need to maintain sodium restriction and the types of foods containing high amounts of sodium to be avoided. Lower extremity edema  Edema has either resolved on the current dose of diuretics step 1 or never developed prior to transfer. Screening for Esophageal varices   The patient has esophageal varices with prior bleeding. Varices were banded in 8/2020. Will schedule for repeat EGD to assess for varices and need for additional banding in 9/2020  Do not start beta-blocker because this is associated with an increased risk of CARMINA and HRS in patients with ascites. Hepatic encephalopathy   It is not cler if the patient had HE after the bleed. She is currently alert and oriented with only mild elevation of NH3 on no lactulose or xifaxan. There is no need for treatment with lactulose and/or Xifaxan at this time. There is no need to restrict dietary protein at this time. Anemia   This is due to multifactorial causes including recent GI bleeding and acute blood loss,   portal hypertension with chronic GI blood loss    The most recent FE studies were from 8/2020. The serum ferritin is 167  The FE saturation is 74%  These results probably unreliable because of transfusion the day prior. She likely has Fe deficiency and will need IV Fe as out-patient    Thrombocytopenia   This is secondary to cirrhosis. There is no evidence of overt bleeding at this time  No treatment is required. The platelet count is under 50K. The use of a platelet growth factor to raise the platelet count and avoid platelet transfusions would be indicated if the patient requires a medical or surgical procedure. SYSTEM REVIEW:  Constitution systems: Negative for fever, chills, weight gain, weight loss. Eyes: Negative for visual changes. ENT: Negative for sore throat, painful swallowing. Respiratory: Negative for cough, hemoptysis, SOB.    Cardiology: Negative for chest pain, palpitations. GI:  Negative for constipation or diarrhea. : Negative for urinary frequency, dysuria, hematuria, nocturia. Skin: Negative for rash. Hematology: Negative for easy bruising, blood clots. Musculo-skelatal: Negative for back pain, muscle pain, weakness. Neurologic: Negative for headaches, dizziness, vertigo, memory problems not related to HE. Psychology: Negative for anxiety, depression. FAMILY HISTORY:  The father  of lung cancer. The mother  of CHF. There is no family history of liver disease. SOCIAL HISTORY:  The patient has never been . The patient has no children. The patient currently smokes half pack of tobacco daily. The patient consumes 1 alcoholic beverage/beer per week. She says she has never consumed more than 1-2 alcohol drinks daily. The patient used to work at [employer] PHYSICAL EXAMINATION:  VS: per nursing note  General:  No acute distress. Eyes:  Sclera icteric  ENT:  No oral lesions. Thyroid normal.  Nodes:  No adenopathy. Skin:  No spider angiomata. No jaundice. Respiratory:  Lungs clear to auscultation. Cardiovascular:  Regular heart rate. Abdomen:  Soft non-tender, Ascites appears to be present. Extremities:  No lower extremity edema. Muscle wasting. Neurologic:  Alert and oriented. Cranial nerves grossly intact. No asterixis. LABORATORY:  Results for Rachel Patel (MRN [id number]) as of 8/10/2020 07:05   Ref.  Range 8/10/2020 04:15   WBC Latest Ref Range: 4.6 - 13.2 K/uL 3.6 (L)   HGB Latest Ref Range: 12.0 - 16.0 g/dL 8.0 (L)   PLATELET Latest Ref Range: 135 - 420 K/uL 27 (LL)   INR Latest Ref Range: 0.8 - 1.2   1.9 (H)   Sodium Latest Ref Range: 136 - 145 mmol/L 139   Potassium Latest Ref Range: 3.5 - 5.5 mmol/L 3.6   Chloride Latest Ref Range: 100 - 111 mmol/L 106   CO2 Latest Ref Range: 21 - 32 mmol/L 29   Glucose Latest Ref Range: 74 - 99 mg/dL 110 (H)   BUN Latest Ref Range: 7.0 - 18 MG/DL 4 (L)   Creatinine Latest Ref Range: 0.6 - 1.3 MG/DL 0.88   Bilirubin, total Latest Ref Range: 0.2 - 1.0 MG/DL 7.0 (H)   Albumin Latest Ref Range: 3.4 - 5.0 g/dL 2.1 (L)   ALT Latest Ref Range: 13 - 56 U/L 14   AST Latest Ref Range: 10 - 38 U/L 39 (H)   Alk. phosphatase Latest Ref Range: 45 - 117 U/L 86   Ammonia Latest Ref Range: 11 - 32 UMOL/L 55 (H)       RADIOLOGY:  8/2020. CT scan abdomen with IV contrast.  Hepatomegaly and splenomegaly. No liver mass lesions. No dilated bile ducts. Moderate ascites. 8/2020. Ultrasound of liver. No ascites.       Konstantin Luong MD  [address]  [address] [place] [address], [address]  12 [facility]

## 2020-08-10 NOTE — PROGRESS NOTES
1838 Margarita Jamil Email sent to Federico Stephy Kita Hurst. @[employer] regarding insurance. 3801 cm met with pt at bedside ,introduced self and explained cm role as d/c planner, when discharged pt plans to return back home with her roommates, cm discussed self pay status pt states she has medicare part A and veterans insurance, pt stated she is 70 % vested with [employer], pt uses [facility] as pcp [phone] pt doesn't recall names stated her psych physician practice from same clinic, pt takes prozac for her bipolar and PTSD has enough meds at home, [employer] explained pt selected to transfer form signed by pt cms will fax to appropriate number, when discharged pt plans on contacting her friend Jaleesa Schwarz for transportation, pt usually takes public transportation to doctors appointments, pt denies using any home DME's independent with care. Reason for Admission:   Transferred to [facility] from [place] for TIPS procedure                   RUR Score:  moderate                PCP: First and Last name:     Name of Practice:    Are you a current patient: Yes/No:    Approximate date of last visit:    Can you participate in a virtual visit if needed:     Do you (patient/family) have any concerns for transition/discharge? Pt listed as self pay              Plan for utilizing home health:  TBD     Current Advanced Directive/Advance Care Plan:  Not on chart pastoral services available if needed. Transition of Care Plan:    Chart reviewed noted pt transferred from [place] for TIPS procedure,hx includes hepatitis C,ETOH,cirrhosis,liver failure,  on case, cm will verify self pay status with pt and will notify [employer] if needed. Care Management Interventions  PCP Verified by CM: Yes  Palliative Care Criteria Met (RRAT>21 & CHF Dx)?: No  Current Support Network:  Other

## 2020-08-11 LAB
ALBUMIN SERPL-MCNC: 3.1 G/DL (ref 3.4–5)
ALBUMIN/GLOB SERPL: 0.9 {RATIO} (ref 0.8–1.7)
ALP SERPL-CCNC: 79 U/L (ref 45–117)
ALT SERPL-CCNC: 16 U/L (ref 13–56)
ANION GAP SERPL CALC-SCNC: 7 MMOL/L (ref 3–18)
AST SERPL-CCNC: 34 U/L (ref 10–38)
BILIRUB SERPL-MCNC: 9 MG/DL (ref 0.2–1)
BLD PROD TYP BPU: NORMAL
BPU ID: NORMAL
BUN SERPL-MCNC: 3 MG/DL (ref 7–18)
BUN/CREAT SERPL: 3 (ref 12–20)
CALCIUM SERPL-MCNC: 8 MG/DL (ref 8.5–10.1)
CALLED TO:,BCALL1: NORMAL
CHLORIDE SERPL-SCNC: 103 MMOL/L (ref 100–111)
CO2 SERPL-SCNC: 30 MMOL/L (ref 21–32)
CREAT SERPL-MCNC: 0.92 MG/DL (ref 0.6–1.3)
ERYTHROCYTE [DISTWIDTH] IN BLOOD BY AUTOMATED COUNT: 23.2 % (ref 11.6–14.5)
GLOBULIN SER CALC-MCNC: 3.3 G/DL (ref 2–4)
GLUCOSE SERPL-MCNC: 99 MG/DL (ref 74–99)
HCT VFR BLD AUTO: 23.6 % (ref 35–45)
HGB BLD-MCNC: 7.4 G/DL (ref 12–16)
INR PPP: 2 (ref 0.8–1.2)
MCH RBC QN AUTO: 28 PG (ref 24–34)
MCHC RBC AUTO-ENTMCNC: 31.4 G/DL (ref 31–37)
MCV RBC AUTO: 89.4 FL (ref 74–97)
PLATELET # BLD AUTO: 27 K/UL (ref 135–420)
PLATELET # BLD AUTO: 44 K/UL (ref 135–420)
POTASSIUM SERPL-SCNC: 3.2 MMOL/L (ref 3.5–5.5)
PROT SERPL-MCNC: 6.4 G/DL (ref 6.4–8.2)
PROTHROMBIN TIME: 21.9 SEC (ref 11.5–15.2)
RBC # BLD AUTO: 2.64 M/UL (ref 4.2–5.3)
SODIUM SERPL-SCNC: 140 MMOL/L (ref 136–145)
STATUS OF UNIT,%ST: NORMAL
UNIT DIVISION, %UDIV: 0
WBC # BLD AUTO: 2.8 K/UL (ref 4.6–13.2)

## 2020-08-11 PROCEDURE — 74011250636 HC RX REV CODE- 250/636: Performed by: FAMILY MEDICINE

## 2020-08-11 PROCEDURE — 80053 COMPREHEN METABOLIC PANEL: CPT

## 2020-08-11 PROCEDURE — 74011250637 HC RX REV CODE- 250/637: Performed by: FAMILY MEDICINE

## 2020-08-11 PROCEDURE — 97116 GAIT TRAINING THERAPY: CPT

## 2020-08-11 PROCEDURE — 85049 AUTOMATED PLATELET COUNT: CPT

## 2020-08-11 PROCEDURE — 36415 COLL VENOUS BLD VENIPUNCTURE: CPT

## 2020-08-11 PROCEDURE — P9047 ALBUMIN (HUMAN), 25%, 50ML: HCPCS | Performed by: FAMILY MEDICINE

## 2020-08-11 PROCEDURE — 85610 PROTHROMBIN TIME: CPT

## 2020-08-11 PROCEDURE — 74011250637 HC RX REV CODE- 250/637: Performed by: HOSPITALIST

## 2020-08-11 PROCEDURE — 36430 TRANSFUSION BLD/BLD COMPNT: CPT

## 2020-08-11 PROCEDURE — 74011000250 HC RX REV CODE- 250: Performed by: FAMILY MEDICINE

## 2020-08-11 PROCEDURE — 74011250637 HC RX REV CODE- 250/637: Performed by: INTERNAL MEDICINE

## 2020-08-11 PROCEDURE — 85027 COMPLETE CBC AUTOMATED: CPT

## 2020-08-11 PROCEDURE — P9035 PLATELET PHERES LEUKOREDUCED: HCPCS

## 2020-08-11 PROCEDURE — 65270000029 HC RM PRIVATE

## 2020-08-11 PROCEDURE — 97166 OT EVAL MOD COMPLEX 45 MIN: CPT

## 2020-08-11 PROCEDURE — 97162 PT EVAL MOD COMPLEX 30 MIN: CPT

## 2020-08-11 RX ORDER — SODIUM CHLORIDE 9 MG/ML
250 INJECTION, SOLUTION INTRAVENOUS AS NEEDED
Status: DISCONTINUED | OUTPATIENT
Start: 2020-08-11 | End: 2020-08-12 | Stop reason: HOSPADM

## 2020-08-11 RX ORDER — POTASSIUM CHLORIDE 20 MEQ/1
40 TABLET, EXTENDED RELEASE ORAL
Status: COMPLETED | OUTPATIENT
Start: 2020-08-11 | End: 2020-08-11

## 2020-08-11 RX ADMIN — METHADONE HYDROCHLORIDE 5 MG: 10 TABLET ORAL at 21:48

## 2020-08-11 RX ADMIN — METHADONE HYDROCHLORIDE 5 MG: 10 TABLET ORAL at 10:08

## 2020-08-11 RX ADMIN — CEFTRIAXONE 1 G: 1 INJECTION, POWDER, FOR SOLUTION INTRAMUSCULAR; INTRAVENOUS at 21:48

## 2020-08-11 RX ADMIN — Medication 10 ML: at 21:49

## 2020-08-11 RX ADMIN — SPIRONOLACTONE 200 MG: 100 TABLET ORAL at 10:08

## 2020-08-11 RX ADMIN — LORAZEPAM 1 MG: 1 TABLET ORAL at 22:39

## 2020-08-11 RX ADMIN — ALBUMIN (HUMAN) 25 G: 0.25 INJECTION, SOLUTION INTRAVENOUS at 04:33

## 2020-08-11 RX ADMIN — FUROSEMIDE 80 MG: 40 TABLET ORAL at 10:08

## 2020-08-11 RX ADMIN — PANTOPRAZOLE SODIUM 40 MG: 40 TABLET, DELAYED RELEASE ORAL at 10:08

## 2020-08-11 RX ADMIN — ALBUMIN (HUMAN) 25 G: 0.25 INJECTION, SOLUTION INTRAVENOUS at 10:09

## 2020-08-11 RX ADMIN — PANTOPRAZOLE SODIUM 40 MG: 40 TABLET, DELAYED RELEASE ORAL at 21:48

## 2020-08-11 RX ADMIN — METHADONE HYDROCHLORIDE 5 MG: 10 TABLET ORAL at 16:31

## 2020-08-11 RX ADMIN — POTASSIUM CHLORIDE 40 MEQ: 750 TABLET, EXTENDED RELEASE ORAL at 06:47

## 2020-08-11 RX ADMIN — Medication 10 ML: at 10:21

## 2020-08-11 RX ADMIN — Medication 10 ML: at 13:26

## 2020-08-11 NOTE — CDMP QUERY
Pt admitted with liver failure, HCV. Pt noted to have anemia. If possible, please document in the progress notes and d/c summary if you are evaluating and / or treating any of the following: ? Anemia due to acute blood loss ? Anemia due to chronic blood loss ? Anemia due to iron deficiency ? Anemia due to chronic disease, please specify disease ? Dilutional anemia 
? Other, please specify ? Clinically unable to determine The medical record reflects the following: 
   Risk Factors: 63 yo female with history of alcohol and opioid abuse, esophageal varices, thrombocytopenia Clinical Indicators: h/h 7.4/23.6  > 8.0//25.3 Treatment: Serial CBC;s Thank you for your time, 
Kednell Magallon RN [facility] [phone]

## 2020-08-11 NOTE — PROGRESS NOTES
Problem: Falls - Risk of  Goal: *Absence of Falls  Description: Document Shakeel Kirkpatrick Fall Risk and appropriate interventions in the flowsheet.   Outcome: Progressing Towards Goal  Note: Fall Risk Interventions:  Mobility Interventions: Utilize walker, cane, or other assistive device, Patient to call before getting OOB    Mentation Interventions: Door open when patient unattended    Medication Interventions: Patient to call before getting OOB

## 2020-08-11 NOTE — PROGRESS NOTES
1953 - Head to toe assessment completed at this time. Pt denies any chest pain or SOB. Pt denies any unusual numbness or tingling to extremities. Pt asked about bowel movement and educated on the importance on not becoming constipated. Pt declined Miralax. Pt offered prune juice and accepted it. Pt received warm prune juice at this time. Pt left in bed with call light within reach. Will continue to monitor. 6600 - Critical lab value called by Cullen. Platelets of 27. 46 - Dr. Chad Persaud paged with critical lab value. Stated that she would order platelets to be transfused. 1001 [facility] from lab stated that platelets have to be ordered for patient and that they are not ready. 0445 - Pt in bed resting at this time. Will continue to monitor. Bedside, Verbal and Written shift change report given to Deepti Canela RN (oncoming nurse) by Cassy Childers RN (offgoing nurse). Report included the following information SBAR, Kardex, Intake/Output, MAR, Recent Results and Quality Measures.

## 2020-08-11 NOTE — PROGRESS NOTES
OCCUPATIONAL THERAPY EVALUATION/DISCHARGE    Patient: Yazmin Gonzales (9 y.o. female)  Date: 8/11/2020  Primary Diagnosis: Liver failure ([facility] [K72.90]  Liver failure ([facility] [K72.90]        Precautions:  (CIWA)  PLOF: Patient reports independent at baseline; was working at [employer], but now on disability. ASSESSMENT AND RECOMMENDATIONS:  Based on the objective data described below, the patient presents with near baseline function. Pt has been walking self to bathroom pushing IV pole. Pt able to complete dressing and toileting w/o assist. Pt able to stand sinkside for grooming. Pt reports difficulty at baseline for claw tub transfer. Instructed on strategies of shower stool next to tub to assist w/ transition vs Bath board that could be removed since it is a shared bathroom. Pt reports only difficult w/ B ankle edema. Pt reports she has compression hose. Encouraged wearing as well as instructed on ankle pumps and monitoring of B ankle edema to communicate to physician. Education: Patient instructed on home safety, body mechanics for optimal respiratory effort, Energy Conservation/Work Simplification Techniques, adaptive strategies and adaptive dressing techniques including clothing modifications with patient verbalizing understanding at this time. Skilled Occupational Therapy is not indicated at this time. Discharge Recommendations: Home Health  Further Equipment Recommendations for Discharge: ?shower stool vs bath board      SUBJECTIVE:   Patient stated I feel a lot better.     OBJECTIVE DATA SUMMARY:     Past Medical History:   Diagnosis Date    Alcohol use     Bipolar 2 disorder ([facility]     Hepatitis C     Heroin abuse ([facility]     PTSD (post-traumatic stress disorder)      Past Surgical History:   Procedure Laterality Date    HX OTHER SURGICAL  08/2020    EGD with variceal banding     Barriers to Learning/Limitations: yes;  emotional and cognitive  Compensate with: visual, verbal, tactile, kinesthetic cues/model    Home Situation:   Home Situation  Home Environment: Other (comment)(Rooming house)  # Steps to Enter: 5  One/Two Story Residence: One story  Living Alone: No  Support Systems: Family member(s), Friends \ neighbors  Patient Expects to be Discharged to[de-identified] Private residence  Current DME Used/Available at Home: None  Tub or Shower Type: Tub(Claw tub)  [x]     Right hand dominant   []     Left hand dominant    Cognitive/Behavioral Status:  Neurologic State: Alert  Orientation Level: Oriented X4  Cognition: Follows commands  Safety/Judgement: Awareness of environment;Decreased insight into deficits    Vision/Perceptual:      Appears WFL     Coordination: BUE  Coordination: Within functional limits  Fine Motor Skills-Upper: Left Intact; Right Intact    Gross Motor Skills-Upper: Left Intact; Right Intact    Balance:  Sitting: Intact  Standing: Intact    Strength: BUE  Strength: Generally decreased, functional    Tone & Sensation: BUE  Sensation: Intact    Range of Motion: BUE  AROM: Generally decreased, functional    Functional Mobility and Transfers for ADLs:  Bed Mobility:  Supine to Sit: Independent  Sit to Supine: Independent  Scooting: Independent  Transfers:  Sit to Stand: Modified independent  Bed to Chair: Modified independent   Toilet Transfer : Modified independent   Bathroom Mobility: Modified independent    ADL Assessment:  Feeding: Independent    Oral Facial Hygiene/Grooming: Modified Independent    Bathing: Modified independent    Upper Body Dressing: Modified independent    Lower Body Dressing: Modified independent    Toileting: Modified independent  ADL Intervention:  Feeding  Drink to Mouth: Independent    Grooming  Position Performed: Standing  Washing Hands: Modified independent    Lower Body Dressing Assistance  Socks:  Independent(ankle-over-knee)  Position Performed: Seated edge of bed    Toileting  Bladder Hygiene: Modified independent  Clothing Management: Modified independent    Cognitive Retraining  Safety/Judgement: Awareness of environment;Decreased insight into deficits    Pain:  Pain level pre-treatment: 0/10   Pain level post-treatment: 0/10   Pain Intervention(s): Medication provided by Nursing (see MAR); Rest, Ice, Repositioning   Response to intervention: Nurse notified, See doc flow sheet    Please refer to the flowsheet for vital signs taken during this treatment. After treatment:   []  Patient left in no apparent distress sitting up in chair  [x]  Patient left in no apparent distress in bed/sitting EOB  [x]  Call bell left within reach  [x]  Nursing notified  []  Caregiver present  []  Bed alarm activated    COMMUNICATION/EDUCATION:   [x]      Role of Occupational Therapy in the acute care setting  [x]      Home safety education was provided and the patient/caregiver indicated understanding. [x]      Patient/family have participated as able and agree with findings and recommendations. []      Patient is unable to participate in plan of care at this time. Thank you for this referral.  Rylee Prater, OTR/L, CSRS, CDCS  Time Calculation: 16 mins      Eval Complexity: History: HIGH Complexity : Extensive review of history including physical, cognitive and psychosocial history ; Examination: MEDIUM Complexity : 3-5 performance deficits relating to physical, cognitive , or psychosocial skils that result in activity limitations and / or participation restrictions; Decision Making:MEDIUM Complexity : Patient may present with comorbidities that affect occupational performnce.  Miniml to moderate modification of tasks or assistance (eg, physical or verbal ) with assesment(s) is necessary to enable patient to complete evaluation

## 2020-08-11 NOTE — PROGRESS NOTES
Hospitalist Progress Note    Patient: Kathy Rodrigez MRN: [id number]  [facility]: [id number]    YOB: 1960  Age: 62 y.o.   Sex: female    DOA: 8/9/2020 LOS:  LOS: 2 days          Chief Complaint:    cirrhosis      Assessment/Plan     Cirrhosis of liver  Appreciate hepatology consult  She has cirrhosis from hep C and likely etoh abuse  She also abuses heroin intransally  At this juncture TIPS not recommended, no lactulose as not encephalopathic, and Liver US will be done  Hep C tx options in discussion     Esophageal varices-s/p banding at [facility] daily  Repeat EGD next month per hepatology  PO protonix BID     Thrombocytopenia-due to liver failure  No blood thinners  And no indication to transfuse platelets as not bleeding and no procedures planned     Coagulopathy due to liver cirrhosis    Hypokalemia-PO Kdur     Hepatitis C-likely due to immunizations in 1970's per hepatology     Cirrhosis of liver-due to hepatitis C and EtOH use     Heroin abuse   methadone TID started     Alcohol abuse-[facility] protocol, but has not drank since being hospitalized now over 5 days     Anemia-stable post variceal banding at [facility]    Stop IV albumin  Diuretics per hepatology    Stable, can do PT today  If does ok, and has eaten, as well as counts stable, can d/c home tomorrow  Disposition :  Patient Active Problem List   Diagnosis Code    Liver failure ([facility] K72.90    Anemia D64.9    Thrombocytopenia ([facility] D69.6    Esophageal varices (HCC) I85.00    Hepatitis C B19.20    Alcohol use Z72.89    Hero[facility] ([facility] F11.10    Cirrhosis of liver ([facility] K74.60    PTSD (post-traumatic stress disorder) F43.10    Hypokalemia E87.6       Subjective:    I feel ok  Ordering breakfast this am  Denies abd pain  Had some nausea overnight  No new issue  No bleeding    Review of systems:    Constitutional: denies fevers, chills  Respiratory: denies SOB, cough  Cardiovascular: denies chest pain, palpitations  Gastrointestinal: denies nausea, vomiting, diarrhea      Vital signs/Intake and Output:  Visit Vitals  /48   Pulse 74   Temp 97.3 °F (36.3 °C)   Resp 16   Ht 5' 8\" (1.727 m)   Wt 88 kg (194 lb)   SpO2 99%   BMI 29.50 kg/m²     Current Shift:  08/10 1901 - 08/11 0700  In: 740 [P.O.:740]  Out: -   Last three shifts:  08/09 0701 - 08/10 1900  In: 850 [I.V.:200]  Out: 1050 [Urine:1050]    Exam:    General: AAF, alert, NAD, OX3  Head/Neck: scleral icterus  CVS:Regular rate and rhythm, no M/R/G, S1/S2 heard, no thrill  Lungs:Clear to auscultation bilaterally, no wheezes, rhonchi, or rales  Abdomen: Soft, Nontender, No distention, Normal Bowel sounds, No hepatomegaly  Extremities: No C/C/E, pulses palpable 2+  Neuro:grossly normal , follows commands  Psych:appropriate                Labs: Results:       Chemistry Recent Labs     08/11/20  0227 08/10/20  0415   GLU 99 110*    139   K 3.2* 3.6    106   CO2 30 29   BUN 3* 4*   CREA 0.92 0.88   CA 8.0* 7.4*   AGAP 7 4   BUCR 3* 5*   AP 79 86   TP 6.4 5.9*   ALB 3.1* 2.1*   GLOB 3.3 3.8   AGRAT 0.9 0.6*      CBC w/Diff Recent Labs     08/11/20  0227 08/10/20  0415   WBC 2.8* 3.6*   RBC 2.64* 2.84*   HGB 7.4* 8.0*   HCT 23.6* 25.3*   PLT 27* 27*      Cardiac Enzymes No results for input(s): CPK, CKND1, ABIOLA in the last 72 hours. No lab exists for component: CKRMB, TROIP   Coagulation Recent Labs     08/11/20  0227 08/10/20  0415   PTP 21.9* 21.3*   INR 2.0* 1.9*       Lipid Panel No results found for: CHOL, CHOLPOCT, CHOLX, CHLST, CHOLV, 199581, HDL, HDLP, LDL, LDLC, DLDLP, 716786, VLDLC, VLDL, TGLX, TRIGL, TRIGP, TGLPOCT, CHHD, CHHDX   BNP No results for input(s): BNPP in the last 72 hours.    Liver Enzymes Recent Labs     08/11/20  0227   TP 6.4   ALB 3.1*   AP 79      Thyroid Studies No results found for: T4, T3U, TSH, TSHEXT     Procedures/imaging: see electronic medical records for all procedures/Xrays and details which were not copied into this note but were reviewed prior to creation of Mady Duong MD

## 2020-08-11 NOTE — PROGRESS NOTES
CDMP Query    Anemia due to recent acute blood loss from bleeding esophageal varices as well as chronic liver disease

## 2020-08-11 NOTE — PROGRESS NOTES
4963 Received report from off going nurse Elizabeth Louise, RN. Report included the following information SBAR, Kardex, Intake/Output, MAR and Recent Results. 0815 Pt ate 90% of breakfast tray. (omelet)    1200 Platelet infusion began. 1215 SCD's not in room. Applied SCD's and provided pt teaching. 1325 Platelet transfusion end.    1334 Pt ambulated w/o assist to bathroom normal gait. SCD's reapplied. 1425 Lab drawn as ordered and sent to lab.    2000 Gave bedside report to oncoming nurse Dudley Barahona, MANJIT. [facility] Report included the following information SBAR, Kardex, Intake/Output, MAR and Recent Results.

## 2020-08-11 NOTE — PROGRESS NOTES
Problem: Mobility Impaired (Adult and Pediatric)  Goal: *Acute Goals and Plan of Care (Insert Text)  Description: Physical Therapy Goals   Initiated 8/11/2020 and to be accomplished within 3 day(s)  1. Patient will perform sit<> stand with mod I/LRAD in prep for transfers/ambulation. 3.  Patient will transfer from bed <> chair with mod I/LRAD for time up in chair for completion of ADL activity. 4.  Patient will ambulate 150 feet mod I/LRAD  for improved functional mobility at discharge. 5.  Patient will ascend/descend flight of stairs with handrail(s) with mod I/LRAD for home navigation. Outcome: Progressing Towards Goal  PHYSICAL THERAPY EVALUATION    Patient: Bridger Can (19 y.o. female)  Date: 8/11/2020  Primary Diagnosis: Liver failure ([facility] [address] [K72.90]  Liver failure ([facility] [address] [K72.90]  Precautions:  Fall,  ([facility])    ASSESSMENT :  Based on the objective data described below, the patient seen on medical unit and presents with decreased activity tolerance and generalized decreased strength and decreased independence/tolerance for ambulation activity during current admission. Pt reports amb independently and living in rooming house PTA. Ptfound in bed in NAD and w/o c/o pain. Pt supine >sit independence, transfers to stand with supervision and participated in GT/RW/min HHA 120ft in means. alf during gt distance, pt became fatigued, appeared light headed and required chair being brought to pt to sit down with min assist. /57, HR 77, O2 sat 100%. Pt rested several minutes then amb back to room. Kelsey slow with decreased foot clearance and occasional path deviations. Pt initially left up in chair and encouraged to eat lunch meal in chair, however, pt requested to return to bed and was assisted with same. Nurse Pio Martel notified of above. Will continue PT to address goals above.  Recommend   HHPT for follow up physical therapy upon discharge and pt may require AD for maximal safety/balance assist during ambulation. Pt Education: Role of physical therapy in acute care setting, fall prevention and safety/technique during functional mobility tasks      Patient will benefit from skilled intervention to address the above impairments. Patients rehabilitation potential is considered to be Fair  Factors which may influence rehabilitation potential include:   []         None noted  []         Mental ability/status  [x]         Medical condition  []         Home/family situation and support systems  []         Safety awareness  []         Pain tolerance/management  []         Other:      PLAN :  Recommendations and Planned Interventions:  [x]           Bed Mobility Training             []    Neuromuscular Re-Education  [x]           Transfer Training                   []    Orthotic/Prosthetic Training  [x]           Gait Training                          []    Modalities  [x]           Therapeutic Exercises          []    Edema Management/Control  [x]           Therapeutic Activities            [x]    Patient and Family Training/Education  []           Other (comment):    Frequency/Duration: Patient will be followed by physical therapy 1-2 times per day to address goals. Discharge Recommendations: Home Health  Further Equipment Recommendations for Discharge: straight cane or rolling walker     SUBJECTIVE:   Patient stated I was feeling alright till I started walking.     OBJECTIVE DATA SUMMARY:     Past Medical History:   Diagnosis Date    Alcohol use     Bipolar 2 disorder ([facility] [address]     Hepatitis C     Heroin abuse ([facility] [address]     PTSD (post-traumatic stress disorder)      Past Surgical History:   Procedure Laterality Date    HX OTHER SURGICAL  08/2020    EGD with variceal banding     Barriers to Learning/Limitations: yes;  physical  Compensate with: Visual Cues, Verbal Cues, and Tactile Cues  Prior Level of Function/Home Situation: independent amb w/o AD   Home Situation  Home Environment: Other (comment)(rooming house)  # Steps to Enter: 5  Rails to Enter: Yes  Hand Rails : Bilateral  One/Two Story Residence: Two story  # of Interior Steps: 12  Interior Rails: Right  Living Alone: No  Support Systems: Family member(s), Friends \ neighbors  Patient Expects to be Discharged to[de-identified] Private residence  Current DME Used/Available at Home: None  Tub or Shower Type: Tub(Claw tub)  Critical Behavior:  Neurologic State: Alert; Appropriate for age  Orientation Level: Oriented X4  Cognition: Follows commands  Safety/Judgement: Awareness of environment; Fall prevention  Psychosocial  Patient Behaviors: Calm; Cooperative  Needs Expressed: Educational;Emotional  Purposeful Interaction: Yes  Pt Identified Daily Priority: Clinical issues (comment)  Caritas Process: Nurture loving kindness;Enable easton/hope;Establish trust;Nurture spiritual self; Attend basic human needs; Teaching/learning;Create healing environment  Caring Interventions: Reassure  Reassure: Therapeutic listening; Informing; Acceptance; Instilling easton and hope  Therapeutic Modalities: Humor; Intentional therapeutic touch  Strength:    Strength: Generally decreased, functional  Tone & Sensation:   Sensation: Intact  Range Of Motion:  AROM: Generally decreased, functional  Functional Mobility:  Bed Mobility:  Supine to Sit: Independent  Sit to Supine: Independent  Scooting: Independent  Transfers:  Sit to Stand: Supervision  Stand to Sit: Supervision  Bed to Chair: Stand-by assistance  Balance:   Sitting: Intact  Standing: Intact  Ambulation/Gait Training:  Distance (ft): 120 Feet (ft)  Assistive Device: Gait belt  Ambulation - Level of Assistance: Contact guard assistance;Minimal assistance(HHA)  Gait Description (WDL): Exceptions to WDL  Gait Abnormalities: Decreased step clearance; Path deviations  Speed/Kelsey: Slow  Step Length: Right shortened;Left shortened  Interventions: Safety awareness training;Verbal cues  Pain:  Pain Scale 1: Numeric (0 - 10)  Pain Intensity 1: 0  Pain Location 1: Abdomen  Pain Description 1: Aching  Pain Intervention(s) 1: Medication (see MAR)  Activity Tolerance:   Poor   Please refer to the flowsheet for vital signs taken during this treatment. After treatment:   []         Patient left in no apparent distress sitting up in chair  [x]         Patient left in no apparent distress in bed  [x]         Call bell left within reach  [x]         Nursing notified  []         Caregiver present  []         Bed alarm activated    COMMUNICATION/EDUCATION:   [x]         Fall prevention education was provided and the patient/caregiver indicated understanding. [x]         Patient/family have participated as able in goal setting and plan of care. [x]         Patient/family agree to work toward stated goals and plan of care. []         Patient understands intent and goals of therapy, but is neutral about his/her participation. []         Patient is unable to participate in goal setting and plan of care.     Eval Complexity: History: HIGH Complexity :3+ comorbidities / personal factors will impact the outcome/ POC Exam:MEDIUM Complexity : 3 Standardized tests and measures addressing body structure, function, activity limitation and / or participation in recreation  Presentation: MEDIUM Complexity : Evolving with changing characteristics  Clinical Decision Making:Medium Complexity    Overall Complexity:MEDIUM    Thank you for this referral.  Nga Shaffer, PT   Time Calculation: 27 mins

## 2020-08-12 VITALS
BODY MASS INDEX: 29.4 KG/M2 | RESPIRATION RATE: 17 BRPM | WEIGHT: 194 LBS | OXYGEN SATURATION: 97 % | TEMPERATURE: 98.2 F | HEIGHT: 68 IN | DIASTOLIC BLOOD PRESSURE: 60 MMHG | HEART RATE: 73 BPM | SYSTOLIC BLOOD PRESSURE: 123 MMHG

## 2020-08-12 LAB
ALBUMIN SERPL-MCNC: 3.3 G/DL (ref 3.4–5)
ALBUMIN/GLOB SERPL: 1 {RATIO} (ref 0.8–1.7)
ALP SERPL-CCNC: 82 U/L (ref 45–117)
ALT SERPL-CCNC: 14 U/L (ref 13–56)
ANION GAP SERPL CALC-SCNC: 6 MMOL/L (ref 3–18)
AST SERPL-CCNC: 35 U/L (ref 10–38)
BILIRUB SERPL-MCNC: 9.5 MG/DL (ref 0.2–1)
BLD PROD TYP BPU: NORMAL
BPU ID: NORMAL
BUN SERPL-MCNC: 4 MG/DL (ref 7–18)
BUN/CREAT SERPL: 4 (ref 12–20)
CALCIUM SERPL-MCNC: 8.3 MG/DL (ref 8.5–10.1)
CALLED TO:,BCALL1: NORMAL
CHLORIDE SERPL-SCNC: 102 MMOL/L (ref 100–111)
CO2 SERPL-SCNC: 31 MMOL/L (ref 21–32)
CREAT SERPL-MCNC: 0.93 MG/DL (ref 0.6–1.3)
ERYTHROCYTE [DISTWIDTH] IN BLOOD BY AUTOMATED COUNT: 22.9 % (ref 11.6–14.5)
GLOBULIN SER CALC-MCNC: 3.3 G/DL (ref 2–4)
GLUCOSE SERPL-MCNC: 75 MG/DL (ref 74–99)
HCT VFR BLD AUTO: 24.9 % (ref 35–45)
HGB BLD-MCNC: 7.7 G/DL (ref 12–16)
INR PPP: 2.1 (ref 0.8–1.2)
MCH RBC QN AUTO: 27.7 PG (ref 24–34)
MCHC RBC AUTO-ENTMCNC: 30.9 G/DL (ref 31–37)
MCV RBC AUTO: 89.6 FL (ref 74–97)
PLATELET # BLD AUTO: 34 K/UL (ref 135–420)
POTASSIUM SERPL-SCNC: 3.2 MMOL/L (ref 3.5–5.5)
PROT SERPL-MCNC: 6.6 G/DL (ref 6.4–8.2)
PROTHROMBIN TIME: 23.1 SEC (ref 11.5–15.2)
RBC # BLD AUTO: 2.78 M/UL (ref 4.2–5.3)
SODIUM SERPL-SCNC: 139 MMOL/L (ref 136–145)
STATUS OF UNIT,%ST: NORMAL
UNIT DIVISION, %UDIV: 0
WBC # BLD AUTO: 3.7 K/UL (ref 4.6–13.2)

## 2020-08-12 PROCEDURE — 85610 PROTHROMBIN TIME: CPT

## 2020-08-12 PROCEDURE — 74011250637 HC RX REV CODE- 250/637: Performed by: INTERNAL MEDICINE

## 2020-08-12 PROCEDURE — 36592 COLLECT BLOOD FROM PICC: CPT

## 2020-08-12 PROCEDURE — 74011250637 HC RX REV CODE- 250/637: Performed by: FAMILY MEDICINE

## 2020-08-12 PROCEDURE — 80053 COMPREHEN METABOLIC PANEL: CPT

## 2020-08-12 PROCEDURE — 74011250637 HC RX REV CODE- 250/637: Performed by: HOSPITALIST

## 2020-08-12 PROCEDURE — 85027 COMPLETE CBC AUTOMATED: CPT

## 2020-08-12 RX ORDER — FUROSEMIDE 40 MG/1
80 TABLET ORAL DAILY
Qty: 60 TAB | Refills: 0 | Status: SHIPPED | OUTPATIENT
Start: 2020-08-12 | End: 2022-08-05

## 2020-08-12 RX ORDER — METHADONE HYDROCHLORIDE 5 MG/1
5 TABLET ORAL 3 TIMES DAILY
Qty: 21 TAB | Refills: 0 | Status: SHIPPED | OUTPATIENT
Start: 2020-08-12 | End: 2020-08-19

## 2020-08-12 RX ORDER — NALOXONE HYDROCHLORIDE 4 MG/.1ML
SPRAY NASAL
Qty: 2 EACH | Refills: 0 | Status: SHIPPED | OUTPATIENT
Start: 2020-08-12 | End: 2022-08-05

## 2020-08-12 RX ORDER — SPIRONOLACTONE 100 MG/1
200 TABLET, FILM COATED ORAL DAILY
Qty: 60 TAB | Refills: 0 | Status: SHIPPED | OUTPATIENT
Start: 2020-08-13 | End: 2022-08-05

## 2020-08-12 RX ORDER — POTASSIUM CHLORIDE 20 MEQ/1
40 TABLET, EXTENDED RELEASE ORAL
Status: COMPLETED | OUTPATIENT
Start: 2020-08-12 | End: 2020-08-12

## 2020-08-12 RX ADMIN — Medication 10 ML: at 05:00

## 2020-08-12 RX ADMIN — METHADONE HYDROCHLORIDE 5 MG: 10 TABLET ORAL at 08:59

## 2020-08-12 RX ADMIN — POTASSIUM CHLORIDE 40 MEQ: 1500 TABLET, EXTENDED RELEASE ORAL at 10:40

## 2020-08-12 RX ADMIN — PANTOPRAZOLE SODIUM 40 MG: 40 TABLET, DELAYED RELEASE ORAL at 09:00

## 2020-08-12 RX ADMIN — SPIRONOLACTONE 200 MG: 100 TABLET ORAL at 09:00

## 2020-08-12 NOTE — PROGRESS NOTES
Pt resting quietly, no complaints. Ambulating to bathroom without assist, voiding to clear light deirdre urine. SCD's on when in bed    0750 Bedside and Verbal shift change report given to Miguel Angel De Souza RN (oncoming nurse) by Brain Phalen, RN   (offgoing nurse). Report included the following information SBAR, Kardex, Intake/Output, MAR and Recent Results.

## 2020-08-12 NOTE — PROGRESS NOTES
2380  Bedside and verbal shift change report given to Rodolfo Hendricks RN (on coming nurse) by Aimee Wilson RN (off going nurse). Report included the following information SBAR, Kardex, OR Summary, Intake/Output and MAR.    1076  Pt is waiting to go home, family members already outside to pick her up. Will notify Dr. Vibha Ambrocio. 1112  Verbal order to remove PICC line by Dr. Vibha Ambrocio. 1201  Picc line removed, site clean, dry & intact. 1214  BRITTON Elise RN -  AVS review with pt, opportunity for questions and concerns at this time. D/c IV clean and dry. Properly discarded armbands.

## 2020-08-12 NOTE — PROGRESS NOTES
Noted plan discharge today. CM met with pt to discuss transition of care recommendation for [facility]. Pt has declined [facility] services at this time. Anticipate pt will transition home today. Pt has indicated she does not have shoes. CM notified nursing staff to assist.  No other transition of care needs have been identified. Care Management Interventions  PCP Verified by CM: Yes  Palliative Care Criteria Met (RRAT>21 & CHF Dx)?: No  Mode of Transport at Discharge: Other (see comment)(Family/friend)  Transition of Care Consult (CM Consult): Discharge Planning  Health Maintenance Reviewed: Yes  Physical Therapy Consult: Yes  Occupational Therapy Consult: Yes  Current Support Network:  Adult Group Home  Confirm Follow Up Transport: Self  The Plan for Transition of Care is Related to the Following Treatment Goals : Home with physician follow up   Discharge Location  Discharge Placement: Home

## 2020-08-12 NOTE — DISCHARGE INSTRUCTIONS
Patient Education   Patient Education        Learning About Transjugular Intrahepatic Portosystemic Shunt (TIPS)  What is TIPS? TIPS is a procedure in which a tube called a stent is placed to join two veins in a damaged liver. One vein carries blood to the liver (portal vein) from the legs and belly. Another vein goes from the liver to the heart (hepatic vein). The scar tissue in a damaged liver can prevent the blood from filtering through the liver. This can cause high blood pressure in the portal vein. TIPS forms a channel that allows some of the blood to bypass the liver. This lowers the pressure and makes veins less likely to rupture and bleed. Why is it done? TIPS is used to reduce high blood pressure in the portal vein that carries blood from the intestines to the liver. TIPS may be used to:  · Treat fluid buildup that continues to happen even though other treatment has been tried. The buildup often happens in the belly. · Treat the bleeding that can happen when veins swell and break open because of the high pressure. This is called variceal bleeding. TIPS may be used when other treatments for the bleeding don't help or while you are waiting for a liver transplant. How is it done? A TIPS procedure may be done by a radiologist, who places a wire-mesh stent through another tube called a catheter. It goes into a vein in the neck. The doctor threads the catheter to the liver and places the stent to join the two veins. The stent is then expanded using a small balloon. What problems can happen from transjugular intrahepatic portosystemic shunt (TIPS)? Problems from a TIPS procedure may include:  · Encephalopathy. This happens when the liver is less able to filter toxins from the bloodstream. The toxins build up in the bloodstream and cause problems in your brain. · Problems with the stent, such as narrowing (stenosis) or closing (occlusion). · Bleeding. · Damage to the blood vessels.   What can you expect after a TIPS procedure? You may go home the same day or the next day after a TIPS procedure. Your doctor or nurse checks to make sure that you're not having any problems before you go home. Where can you learn more? Go to http://cristóbal-emily.info/  Enter T470 in the search box to learn more about \"Learning About Transjugular Intrahepatic Portosystemic Shunt (TIPS). \"  Current as of: August 12, 2019               Content Version: 12.5  © 3675-9301 [employer]. Care instructions adapted under license by [employer] (which disclaims liability or warranty for this information). If you have questions about a medical condition or this instruction, always ask your healthcare professional. [address] any warranty or liability for your use of this information. Anemia: Care Instructions  Your Care Instructions     Anemia is a low level of red blood cells, which carry oxygen throughout your body. Many things can cause anemia. Lack of iron is one of the most common causes. Your body needs iron to make hemoglobin, a substance in red blood cells that carries oxygen from the lungs to your body's cells. Without enough iron, the body produces fewer and smaller red blood cells. As a result, your body's cells do not get enough oxygen, and you feel tired and weak. And you may have trouble concentrating. Bleeding is the most common cause of a lack of iron. You may have heavy menstrual bleeding or bleeding caused by conditions such as ulcers, hemorrhoids, or cancer. Regular use of aspirin or other anti-inflammatory medicines (such as ibuprofen) also can cause bleeding in some people. A lack of iron in your diet also can cause anemia, especially at times when the body needs more iron, such as during pregnancy, infancy, and the teen years. Your doctor may have prescribed iron pills.  It may take several months of treatment for your iron levels to return to normal. Your doctor also may suggest that you eat foods that are rich in iron, such as meat and beans. There are many other causes of anemia. It is not always due to a lack of iron. Finding the specific cause of your anemia will help your doctor find the right treatment for you. Follow-up care is a key part of your treatment and safety. Be sure to make and go to all appointments, and call your doctor if you are having problems. It's also a good idea to know your test results and keep a list of the medicines you take. How can you care for yourself at home? · Take your medicines exactly as prescribed. Call your doctor if you think you are having a problem with your medicine. · If your doctor recommends iron pills, take them as directed:  ? Try to take the pills on an empty stomach about 1 hour before or 2 hours after meals. But you may need to take iron with food to avoid an upset stomach. ? Do not take antacids or drink milk or caffeine drinks (such as coffee, tea, or cola) at the same time or within 2 hours of the time that you take your iron. They can make it hard for your body to absorb the iron. ? Vitamin C (from food or supplements) helps your body absorb iron. Try taking iron pills with a glass of orange juice or some other food that is high in vitamin C, such as citrus fruits. ? Iron pills may cause stomach problems, such as heartburn, nausea, diarrhea, constipation, and cramps. Be sure to drink plenty of fluids, and include fruits, vegetables, and fiber in your diet each day. Iron pills often make your bowel movements dark or green. ? If you forget to take an iron pill, do not take a double dose of iron the next time you take a pill. ? Keep iron pills out of the reach of small children. An overdose of iron can be very dangerous. · Follow your doctor's advice about eating iron-rich foods.  These include red meat, shellfish, poultry, eggs, beans, raisins, whole-grain bread, and leafy green vegetables. · Steam vegetables to help them keep their iron content. When should you call for help? [id number] anytime you think you may need emergency care. For example, call if:  · You have symptoms of a heart attack. These may include:  ? Chest pain or pressure, or a strange feeling in the chest.  ? Sweating. ? Shortness of breath. ? Nausea or vomiting. ? Pain, pressure, or a strange feeling in the back, neck, jaw, or upper belly or in one or both shoulders or arms. ? Lightheadedness or sudden weakness. ? A fast or irregular heartbeat. After you call 911, the  may tell you to chew 1 adult-strength or 2 to 4 low-dose aspirin. Wait for an ambulance. Do not try to drive yourself. · You passed out (lost consciousness). Call your doctor now or seek immediate medical care if:  · You have new or increased shortness of breath. · You are dizzy or lightheaded, or you feel like you may faint. · Your fatigue and weakness continue or get worse. · You have any abnormal bleeding, such as:  ? Nosebleeds. ? Vaginal bleeding that is different (heavier, more frequent, at a different time of the month) than what you are used to.  ? Bloody or black stools, or rectal bleeding. ? Bloody or pink urine. Watch closely for changes in your health, and be sure to contact your doctor if:  · You do not get better as expected. Where can you learn more? Go to http://cristóbal-emily.info/  Enter R301 in the search box to learn more about \"Anemia: Care Instructions. \"  Current as of: November 8, 2019               Content Version: 12.5  © 4573-5488 [employer]. Care instructions adapted under license by [employer] (which disclaims liability or warranty for this information). If you have questions about a medical condition or this instruction, always ask your healthcare professional. [place] [address] any warranty or liability for your use of this information.

## 2020-08-12 NOTE — PROGRESS NOTES
[address] [facility] [place], [place], [address], Cite Eileen Bolaños, MD Jareth Mccarthy PA-C Kae Aho, [facility]     Davina Novak, [facility]   JULIA Krishnamurthy, [facility]       Zonia Biggs Andrew De Nath 136    at [address], [address] Josh    1400 [facility], Rafaela  22.    [phone]    FAX: [address] [place] [address] Avenue    [address], [address]    [phone]    FAX: [phone]         HEPATOLOGY PROGRESS NOTE  The patient is a 62year old female with cirrhosis secondary to chronic HCV. She probably acquired HCV from mass vaccination after enrolling in the [employer] in the 1970s. She was found to have HCV in the 1990s. She was never treated for HCV. She found out she had cirrhosis 1 week ago when she had hematemesis and She was taken to [address] [place] [address] and underwent EGD with banding of esophageal varices. She developed ascites and HE    I was called about her on Thursday with request to transfer for emergency TIPS. We had no beds at [facility] and she was finally transfered yesterday. She is doing well this AM.  Eating and ambulating. ECHO shows no heart abnormalities. US showed only minimal ascites. Paracentesis not performed. Labs this AM:  INR slightly up to 2.1, TBILI 9.5, HB 7.7, PLT 34, MELD score 23    Ideally, she needs a liver transplant if she is otherwise a candidate. She does have IV drug and alcohol issue but now that on methadone she says she does not need drugs. Please give her Rx for methadone so she can get this at [facility].    I have spoken to Liver transplant team at [facility] in [address] [facility]. They will make arrangements to get her there for LT evaluation. She should go home on step 2 diuretics.   She will need repeat EGD in for more banding in Sept.  She can get that at [facility] or with me through [place] [facility].        ASSESSMENT AND PLAN:  Cirrhosis  Cirrhosis is secondary to chronic HCV. The diagnosis of cirrhosis is based upon imaging, laboratory studies, complications of cirrhosis. The CTP is 11. Child class C. The MELD score is 23. Chronic HCV   Chronic HCV with cirrhosis. She probably acquired HCV in the 1970s with mass vaccination when she enlisted in the [employer]. She found out she had HCV in the 1990s. She was never treated for HCV. She found out she had cirrhosis in 8/2020    Liver transaminases are normal.  ALP is normal.  Total bilirubin is elevated. Serum albumin is depressed. INR is prolonged. The platelet count is depressed. The last HCV RNA was log 5.12 intU in 5/2019. HCV genotype has not been defined. Serologic testing for HBV is negative. Will perform imaging of the liver with ultrasound and duplex of HV and PV. Chronic HCV Treatment  The patient has not been treated for HCV. The patient has HCV genotype that is not yet defined. Discussed the treatment alternatives. The SVR/cure rate for HCV now exceeds 97% without significant side effects for most patients with HCV. The specific treatment is dependent upon genotype, viral load and histology. The patient could be treated with Epclusa (sofosbuvir and valpitasvir) for 12 weeks. The SVR/cure rate for is about 90% because she has decompensated cirrhosis. Alternatively she has a MELD score of 21 and if she is a candidate for liver transplant she could be treated for HCV after the liver transplant. THis would increase her chance for SVR/cayetano to 98%,    Ascites   Ascites has developed for thje first time in 8/2020 after variceal bleeding.   Ascites has essentially resolved on current dose of step 1 diuretics   No need for paracentesis  Scr and Sna were normal so we increased diuretics to step 2 yesterday. Tolerting this well. There is no indication for TIPS at this time. The patient was counseled regarding the need to maintain sodium restriction and the types of foods containing high amounts of sodium to be avoided. Lower extremity edema  Edema has either resolved on the current dose of diuretics step 1 or never developed prior to transfer. Screening for Esophageal varices   The patient has esophageal varices with prior bleeding. Varices were banded in 8/2020. Will schedule for repeat EGD to assess for varices and need for additional banding in 9/2020  Do not start beta-blocker because this is associated with an increased risk of CARMINA and HRS in patients with ascites. Hepatic encephalopathy   It is not clear if the patient had HE after the bleed. She is currently alert and oriented with only mild elevation of NH3 on no lactulose or xifaxan. There is no need for treatment with lactulose and/or Xifaxan at this time. There is no need to restrict dietary protein at this time. Anemia   This is due to multifactorial causes including recent GI bleeding and acute blood loss,   portal hypertension with chronic GI blood loss    The most recent FE studies were from 8/2020. The serum ferritin is 167  The FE saturation is 74%  These results probably unreliable because of transfusion the day prior. She likely has Fe deficiency and will need IV Fe as out-patient    Thrombocytopenia   This is secondary to cirrhosis. There is no evidence of overt bleeding at this time  No treatment is required. The platelet count is under 50K. The use of a platelet growth factor to raise the platelet count and avoid platelet transfusions would be indicated if the patient requires a medical or surgical procedure. Drug use  She used IV heroin every few days but has never been offered methadone by [facility]  She has never had a complication of IV drug use.   Now that she is on methadone she says she has no craving for heroin. Please give her Rx for methadone to be filled by [facility] after discharge. Counseling for alcohol in patients with chronic liver disease  The patient has cirrhosis and was advised to be abstinent from all alcohol including non-alcoholic beer which does contain some alcohol. The patient does not consume any significant amount of alcohol. Only 1 per week. She says she will stop this. PHYSICAL EXAMINATION:  VS: per nursing note  General:  No acute distress. Eyes:  Sclera icteric  ENT:  No oral lesions. Thyroid normal.  Nodes:  No adenopathy. Skin:  No spider angiomata. No jaundice. Respiratory:  Lungs clear to auscultation. Cardiovascular:  Regular heart rate. Abdomen:  Soft non-tender, Ascites appears to be present. Extremities:  No lower extremity edema. Muscle wasting. Neurologic:  Alert and oriented. Cranial nerves grossly intact. No asterixis. LABORATORY:  Results for Musa Dickey (MRN [id number]) as of 8/12/2020 07:22   Ref.  Range 8/10/2020 04:15 8/11/2020 02:27 8/12/2020 04:47   WBC Latest Ref Range: 4.6 - 13.2 K/uL 3.6 (L) 2.8 (L) 3.7 (L)   HGB Latest Ref Range: 12.0 - 16.0 g/dL 8.0 (L) 7.4 (L) 7.7 (L)   PLATELET Latest Ref Range: 135 - 420 K/uL 27 (LL) 27 (LL) 34 (L)   INR Latest Ref Range: 0.8 - 1.2   1.9 (H) 2.0 (H) 2.1 (H)   Sodium Latest Ref Range: 136 - 145 mmol/L 139 140 139   Potassium Latest Ref Range: 3.5 - 5.5 mmol/L 3.6 3.2 (L) 3.2 (L)   Chloride Latest Ref Range: 100 - 111 mmol/L 106 103 102   CO2 Latest Ref Range: 21 - 32 mmol/L 29 30 31   Glucose Latest Ref Range: 74 - 99 mg/dL 110 (H) 99 75   BUN Latest Ref Range: 7.0 - 18 MG/DL 4 (L) 3 (L) 4 (L)   Creatinine Latest Ref Range: 0.6 - 1.3 MG/DL 0.88 0.92 0.93   Bilirubin, total Latest Ref Range: 0.2 - 1.0 MG/DL 7.0 (H) 9.0 (H) 9.5 (H)   Albumin Latest Ref Range: 3.4 - 5.0 g/dL 2.1 (L) 3.1 (L) 3.3 (L)   ALT Latest Ref Range: 13 - 56 U/L 14 16 14   AST Latest Ref Range: 10 - 38 U/L 39 (H) 34 35 Alk. phosphatase Latest Ref Range: 45 - 117 U/L 86 79 82   Ammonia Latest Ref Range: 11 - 32 UMOL/L 55 (H)         RADIOLOGY:  8/2020. CT scan abdomen with IV contrast.  Hepatomegaly and splenomegaly. No liver mass lesions. No dilated bile ducts. Moderate ascites. 8/2020. Ultrasound of liver. No ascites.       Sarah Mitchell MD  [address]  [address] [facility], [address] [place] [address] Eduin, [address]  12 Critical access hospital

## 2020-08-12 NOTE — PROGRESS NOTES
Problem: Falls - Risk of  Goal: *Absence of Falls  Description: Document Sneha Gary Fall Risk and appropriate interventions in the flowsheet.   Outcome: Progressing Towards Goal  Note: Fall Risk Interventions:  Mobility Interventions: Assess mobility with egress test    Mentation Interventions: Adequate sleep, hydration, pain control    Medication Interventions: Teach patient to arise slowly

## 2020-08-12 NOTE — PROGRESS NOTES
Discharge instructions reviewed with the patient. Patient verbalized understanding and verified by teach back. All questions answered. IV discontinued, no redness, swelling or pain noted. Patient awaiting family for transportation home, with an ETA of 30 min. Patient armband removed and given to patient to take home. Patient was informed of the privacy risks if armband lost or stolen.

## 2020-08-13 NOTE — DISCHARGE SUMMARY
Discharge Summary    Patient: Star Johnson MRN: [id number]  CSN: [id number]    YOB: 1960  Age: 62 y.o. Sex: female    DOA: 8/9/2020 LOS:  LOS: 3 days   Discharge Date: 8/12/2020     Primary Care Provider:  Sarah Alvarez MD    Admission Diagnoses: Liver failure [facility]) [K72.90]  Liver failure [facility]) [K72.90]    Discharge Diagnoses:    Problem List as of 8/12/2020 Date Reviewed: 8/10/2020          Codes Class Noted - Resolved    Anemia ICD-10-CM: D64.9  ICD-9-CM: 285.9  8/10/2020 - Present        Thrombocytopenia ([facility] ICD-10-CM: D69.6  ICD-9-CM: 287.5  8/10/2020 - Present        Esophageal varices ([facility].) ICD-10-CM: I85.00  ICD-9-CM: 456.1  8/10/2020 - Present        Hepatitis C ICD-10-CM: B19.20  ICD-9-CM: 070.70  Unknown - Present        Alcohol use ICD-10-CM: Z72.89  ICD-9-CM: V49.89  8/10/2020 - Present        Heroin abuse ([facility].) ICD-10-CM: F11.10  ICD-9-CM: 305.50  8/10/2020 - Present        Cirrhosis of liver ([facility].) ICD-10-CM: K74.60  ICD-9-CM: 571.5  8/10/2020 - Present        PTSD (post-traumatic stress disorder) ICD-10-CM: F43.10  ICD-9-CM: 309.81  8/10/2020 - Present        Hypokalemia ICD-10-CM: E87.6  ICD-9-CM: 276.8  8/10/2020 - Present        * (Principal) Liver failure ([facility] ICD-10-CM: K72.90  ICD-9-CM: 572.8  8/9/2020 - Present              Discharge Medications:     Discharge Medication List as of 8/12/2020 12:38 PM      START taking these medications    Details   furosemide (LASIX) 40 mg tablet Take 2 Tabs by mouth daily. , Print, Disp-60 Tab,R-0      methadone (DOLOPHINE) 5 mg tablet Take 1 Tab by mouth three (3) times daily for 7 days. Max Daily Amount: 15 mg., Print, Disp-21 Tab,R-0      spironolactone (ALDACTONE) 100 mg tablet Take 2 Tabs by mouth daily. , Print, Disp-60 Tab,R-0      naloxone (Narcan) 4 mg/actuation nasal spray Use 1 spray intranasally, then discard. Repeat with new spray every 2 min as needed for opioid overdose symptoms, alternating nostrils. , Print, Disp-2 Each,R-0             Discharge Condition: Good    Procedures : none    Consults: Hepatology      PHYSICAL EXAM   Visit Vitals  /60 (BP 1 Location: Left arm, BP Patient Position: Supine)   Pulse 73   Temp 98.2 °F (36.8 °C)   Resp 17   Ht 5' 8\" (1.727 m)   Wt 88 kg (194 lb)   SpO2 97%   BMI 29.50 kg/m²     General: Awake, cooperative, no acute distress    HEENT: NC, Atraumatic. PERRLA, EOMI. icteric sclerae. Lungs:  CTA Bilaterally. No Wheezing/Rhonchi/Rales. Heart:  Regular  rhythm,  No murmur, No Rubs, No Gallops  Abdomen: Soft, Non distended, Non tender. +Bowel sounds,   Extremities: No c/c/e  Psych:   Not anxious or agitated. Neurologic:  No acute neurological deficits. Admission HPI :   Mariam Bhatt is a 62 y.o. female who has ongoing intranasal heroin abuse and EtOH use (one beer per week) who was treated at the [place] last week for a CHF exacerbation (she had swelling in her legs and shortness of breath). After she was discharged, she developed acute upper GI bleeding and was admitted to [facility], where she was found to be in liver failure. She did not have paracentesis during her admission there. She was transferred to [facility] for TIPS procedure and hepatology care. She denies chest pain, shortness of breath, or bleeding at this time.  [facility] :   Ms. Doll was admitted to medical floor, she was seen and followed by hepatology, she did not had any acute events during hospitalization. Cirrhosis-  Secondary to chronic hepatitis C. Meld score of 23, child class C, CTP score of 11. Ascites-  She was initially started on step 1 diuretics and it was increased to step to diuresis. Hepatology did not think that patient needed TIPS at this time. She will be discharged on step to diuresis. Patient counseled regarding sodium restriction. Esophageal varices-  She had EGD done at [facility] with banding.   Hepatology recommended repeating EGD next month. Hepatic encephalopathy-  She was initially on lactulose, her ammonia had mild elevation her lactulose not continued during this hospitalization. Hepatology did not recommend lactulose or Xifaxan. Anemia-  Multifactorial however majority contributed by recent GI bleed. Monitored H&H. Thrombocytopenia-  Secondary to cirrhosis. No evidence of bleeding. IV drug use-  Discharged on methadone. Counseled on substance abuse. Activity: Activity as tolerated    Diet: Regular Diet    Follow-up: PCP, hepatology    Disposition: home    Minutes spent on discharge: 45       Labs: Results:       Chemistry Recent Labs     08/12/20  0447 08/11/20  0227 08/10/20  0415   GLU 75 99 110*    140 139   K 3.2* 3.2* 3.6    103 106   CO2 31 30 29   BUN 4* 3* 4*   CREA 0.93 0.92 0.88   CA 8.3* 8.0* 7.4*   AGAP 6 7 4   BUCR 4* 3* 5*   AP 82 79 86   TP 6.6 6.4 5.9*   ALB 3.3* 3.1* 2.1*   GLOB 3.3 3.3 3.8   AGRAT 1.0 0.9 0.6*      CBC w/Diff Recent Labs     08/12/20  0447 08/11/20  1430 08/11/20  0227 08/10/20  0415   WBC 3.7*  --  2.8* 3.6*   RBC 2.78*  --  2.64* 2.84*   HGB 7.7*  --  7.4* 8.0*   HCT 24.9*  --  23.6* 25.3*   PLT 34* 44* 27* 27*      Cardiac Enzymes No results for input(s): CPK, CKND1, ABIOLA in the last 72 hours. No lab exists for component: CKRMB, TROIP   Coagulation Recent Labs     08/12/20  0505 08/11/20 0227   PTP 23.1* 21.9*   INR 2.1* 2.0*       Lipid Panel No results found for: CHOL, CHOLPOCT, CHOLX, CHLST, CHOLV, 191598, HDL, HDLP, LDL, LDLC, DLDLP, 128842, VLDLC, VLDL, TGLX, TRIGL, TRIGP, TGLPOCT, CHHD, CHHDX   BNP No results for input(s): BNPP in the last 72 hours.    Liver Enzymes Recent Labs     08/12/20  0447   TP 6.6   ALB 3.3*   AP 82      Thyroid Studies No results found for: T4, T3U, TSH, TSHEXT         Significant Diagnostic Studies: Us Abd Ltd W Contrast    Result Date: 8/10/2020  EXAM: Right upper quadrant ultrasound with contrast INDICATION: 62year-old patient with hepatitis C, alcohol use, liver failure with portal hypertension. COMPARISON: None. TECHNIQUE: Real-time abdomen/right upper quadrant sonography in multiple planes was performed with image documentation. Grayscale, color flow Doppler imaging, and velocity spectral waveform analysis of the portal vein was performed (duplex imaging). In addition, intravenous access was utilized for the uneventful administration of ultrasound contrast for evaluation of hepatic vasculature with representative images saved to PACS. _______________ FINDINGS: LIVER: Diffusely coarsened hepatic parenchymal echogenicity with lobular surface contour. No focal mass present on the provided images. > Common hepatic artery is patent with resistive index of 0.66   > Main portal vein is patent with hepatopedal flow direction, measuring approximately 1.3 cm in size.   > Right and left portal venous branches are both patent.   > IVC, right hepatic vein, middle hepatic vein, left hepatic vein are all patent. BILIARY SYSTEM: No intrahepatic biliary dilatation. Common bile duct is normal in caliber measuring cm. GALLBLADDER: Small gallstone noted. Thickened appearance to the gallbladder wall. OTHER: Small quantity of perihepatic ascites. . _____________     IMPRESSION: 1. Cirrhotic hepatic morphology. Patent main, right, left portal venous branches. Patent hepatic venous ridges and IVC. 2. Cholelithiasis. Gallbladder wall thickening is nonspecific and very likely extrabiliary in etiology. No results found for this or any previous visit. Please note that this dictation was completed with [employer], the computer voice recognition software. Quite often unanticipated grammatical, syntax, homophones, and other interpretive errors are inadvertently transcribed by the computer software. Please disregard these errors. Please excuse any errors that have escaped final proofreading.      CC: Janice Blair MD

## 2020-08-19 LAB
HCV GENOTYPE: NORMAL
HCV GENTYP SERPL NAA+PROBE: NORMAL
HCV RNA SERPL NAA+PROBE-ACNC: NORMAL IU/ML
HCV RNA SERPL NAA+PROBE-LOG IU: 5.88 LOG10 IU/ML
PLEASE NOTE, 550474: NORMAL
TEST INFORMATION, 550045: NORMAL

## 2022-08-02 ENCOUNTER — HOSPITAL ENCOUNTER (EMERGENCY)
Dept: CT IMAGING | Age: 62
Discharge: HOME | DRG: 432 | End: 2022-08-02
Attending: EMERGENCY MEDICINE
Payer: MEDICARE

## 2022-08-02 ENCOUNTER — HOSPITAL ENCOUNTER (INPATIENT)
Age: 62
LOS: 2 days | Discharge: HOME | DRG: 432 | End: 2022-08-05
Attending: STUDENT IN AN ORGANIZED HEALTH CARE EDUCATION/TRAINING PROGRAM | Admitting: INTERNAL MEDICINE
Payer: MEDICARE

## 2022-08-02 DIAGNOSIS — R04.0 EPISTAXIS: ICD-10-CM

## 2022-08-02 DIAGNOSIS — I86.4 ESOPHAGEAL AND GASTRIC VARICES (HCC): ICD-10-CM

## 2022-08-02 DIAGNOSIS — I85.00 ESOPHAGEAL AND GASTRIC VARICES (HCC): ICD-10-CM

## 2022-08-02 DIAGNOSIS — K92.0 HEMATEMESIS WITH NAUSEA: ICD-10-CM

## 2022-08-02 DIAGNOSIS — K74.60 CIRRHOSIS OF LIVER WITHOUT ASCITES, UNSPECIFIED HEPATIC CIRRHOSIS TYPE (HCC): Primary | ICD-10-CM

## 2022-08-02 LAB
ABO + RH BLD: NORMAL
ALBUMIN SERPL-MCNC: 4.1 G/DL (ref 3.4–5)
ALBUMIN/GLOB SERPL: 0.8 {RATIO} (ref 0.8–1.7)
ALP SERPL-CCNC: 252 U/L (ref 45–117)
ALT SERPL-CCNC: 33 U/L (ref 13–56)
AMMONIA PLAS-SCNC: 65 UMOL/L (ref 11–32)
ANION GAP SERPL CALC-SCNC: 8 MMOL/L (ref 3–18)
APPEARANCE UR: CLEAR
AST SERPL-CCNC: 30 U/L (ref 10–38)
BACTERIA URNS QL MICRO: ABNORMAL /HPF
BASOPHILS # BLD: 0.1 K/UL (ref 0–0.1)
BASOPHILS NFR BLD: 1 % (ref 0–2)
BILIRUB SERPL-MCNC: 0.9 MG/DL (ref 0.2–1)
BILIRUB UR QL: NEGATIVE
BLOOD GROUP ANTIBODIES SERPL: NORMAL
BUN SERPL-MCNC: 16 MG/DL (ref 7–18)
BUN/CREAT SERPL: 16 (ref 12–20)
CALCIUM SERPL-MCNC: 10.5 MG/DL (ref 8.5–10.1)
CHLORIDE SERPL-SCNC: 107 MMOL/L (ref 100–111)
CO2 SERPL-SCNC: 27 MMOL/L (ref 21–32)
COLOR UR: YELLOW
CREAT SERPL-MCNC: 1 MG/DL (ref 0.6–1.3)
DIFFERENTIAL METHOD BLD: ABNORMAL
EOSINOPHIL # BLD: 0.1 K/UL (ref 0–0.4)
EOSINOPHIL NFR BLD: 1 % (ref 0–5)
EPITH CASTS URNS QL MICRO: ABNORMAL /LPF (ref 0–5)
ERYTHROCYTE [DISTWIDTH] IN BLOOD BY AUTOMATED COUNT: 14.6 % (ref 11.6–14.5)
ETHANOL SERPL-MCNC: <3 MG/DL (ref 0–3)
GLOBULIN SER CALC-MCNC: 5.4 G/DL (ref 2–4)
GLUCOSE SERPL-MCNC: 117 MG/DL (ref 74–99)
GLUCOSE UR STRIP.AUTO-MCNC: NEGATIVE MG/DL
HCT VFR BLD AUTO: 45.3 % (ref 35–45)
HGB BLD-MCNC: 15.1 G/DL (ref 12–16)
HGB UR QL STRIP: NEGATIVE
IMM GRANULOCYTES # BLD AUTO: 0 K/UL (ref 0–0.04)
IMM GRANULOCYTES NFR BLD AUTO: 0 % (ref 0–0.5)
INR PPP: 1 (ref 0.8–1.2)
KETONES UR QL STRIP.AUTO: ABNORMAL MG/DL
LACTATE BLD-SCNC: 1.35 MMOL/L (ref 0.4–2)
LACTATE BLD-SCNC: 2.6 MMOL/L (ref 0.4–2)
LEUKOCYTE ESTERASE UR QL STRIP.AUTO: NEGATIVE
LIPASE SERPL-CCNC: 357 U/L (ref 73–393)
LYMPHOCYTES # BLD: 1.8 K/UL (ref 0.9–3.6)
LYMPHOCYTES NFR BLD: 19 % (ref 21–52)
MAGNESIUM SERPL-MCNC: 1.8 MG/DL (ref 1.6–2.6)
MCH RBC QN AUTO: 27.7 PG (ref 24–34)
MCHC RBC AUTO-ENTMCNC: 33.3 G/DL (ref 31–37)
MCV RBC AUTO: 83 FL (ref 78–100)
MONOCYTES # BLD: 0.5 K/UL (ref 0.05–1.2)
MONOCYTES NFR BLD: 5 % (ref 3–10)
MUCOUS THREADS URNS QL MICRO: ABNORMAL /LPF
NEUTS SEG # BLD: 7.2 K/UL (ref 1.8–8)
NEUTS SEG NFR BLD: 74 % (ref 40–73)
NITRITE UR QL STRIP.AUTO: NEGATIVE
NRBC # BLD: 0 K/UL (ref 0–0.01)
NRBC BLD-RTO: 0 PER 100 WBC
PH UR STRIP: 5.5 [PH] (ref 5–8)
PLATELET # BLD AUTO: ABNORMAL K/UL (ref 135–420)
PLATELET COMMENTS,PCOM: ABNORMAL
POTASSIUM SERPL-SCNC: 3.8 MMOL/L (ref 3.5–5.5)
PROT SERPL-MCNC: 9.5 G/DL (ref 6.4–8.2)
PROT UR STRIP-MCNC: ABNORMAL MG/DL
PROTHROMBIN TIME: 14 SEC (ref 11.5–15.2)
RBC # BLD AUTO: 5.46 M/UL (ref 4.2–5.3)
RBC #/AREA URNS HPF: ABNORMAL /HPF (ref 0–5)
RBC MORPH BLD: ABNORMAL
SODIUM SERPL-SCNC: 142 MMOL/L (ref 136–145)
SP GR UR REFRACTOMETRY: 1.03 (ref 1–1.03)
SPECIMEN EXP DATE BLD: NORMAL
TROPONIN-HIGH SENSITIVITY: 12 NG/L (ref 0–54)
UROBILINOGEN UR QL STRIP.AUTO: 1 EU/DL (ref 0.2–1)
WBC # BLD AUTO: 9.7 K/UL (ref 4.6–13.2)
WBC URNS QL MICRO: ABNORMAL /HPF (ref 0–4)

## 2022-08-02 PROCEDURE — 85025 COMPLETE CBC W/AUTO DIFF WBC: CPT

## 2022-08-02 PROCEDURE — 82140 ASSAY OF AMMONIA: CPT

## 2022-08-02 PROCEDURE — 83605 ASSAY OF LACTIC ACID: CPT

## 2022-08-02 PROCEDURE — 86900 BLOOD TYPING SEROLOGIC ABO: CPT

## 2022-08-02 PROCEDURE — 93005 ELECTROCARDIOGRAM TRACING: CPT

## 2022-08-02 PROCEDURE — 74011636637 HC RX REV CODE- 636/637: Performed by: STUDENT IN AN ORGANIZED HEALTH CARE EDUCATION/TRAINING PROGRAM

## 2022-08-02 PROCEDURE — 81025 URINE PREGNANCY TEST: CPT

## 2022-08-02 PROCEDURE — 74011000250 HC RX REV CODE- 250: Performed by: STUDENT IN AN ORGANIZED HEALTH CARE EDUCATION/TRAINING PROGRAM

## 2022-08-02 PROCEDURE — 81001 URINALYSIS AUTO W/SCOPE: CPT

## 2022-08-02 PROCEDURE — 83735 ASSAY OF MAGNESIUM: CPT

## 2022-08-02 PROCEDURE — 74011250636 HC RX REV CODE- 250/636: Performed by: STUDENT IN AN ORGANIZED HEALTH CARE EDUCATION/TRAINING PROGRAM

## 2022-08-02 PROCEDURE — 84484 ASSAY OF TROPONIN QUANT: CPT

## 2022-08-02 PROCEDURE — 96374 THER/PROPH/DIAG INJ IV PUSH: CPT

## 2022-08-02 PROCEDURE — 74176 CT ABD & PELVIS W/O CONTRAST: CPT

## 2022-08-02 PROCEDURE — 80307 DRUG TEST PRSMV CHEM ANLYZR: CPT

## 2022-08-02 PROCEDURE — C9113 INJ PANTOPRAZOLE SODIUM, VIA: HCPCS | Performed by: STUDENT IN AN ORGANIZED HEALTH CARE EDUCATION/TRAINING PROGRAM

## 2022-08-02 PROCEDURE — 82077 ASSAY SPEC XCP UR&BREATH IA: CPT

## 2022-08-02 PROCEDURE — 80053 COMPREHEN METABOLIC PANEL: CPT

## 2022-08-02 PROCEDURE — 83690 ASSAY OF LIPASE: CPT

## 2022-08-02 PROCEDURE — 85610 PROTHROMBIN TIME: CPT

## 2022-08-02 PROCEDURE — 96375 TX/PRO/DX INJ NEW DRUG ADDON: CPT

## 2022-08-02 PROCEDURE — 70450 CT HEAD/BRAIN W/O DYE: CPT

## 2022-08-02 PROCEDURE — 99285 EMERGENCY DEPT VISIT HI MDM: CPT

## 2022-08-02 RX ORDER — MORPHINE SULFATE 4 MG/ML
4 INJECTION INTRAVENOUS ONCE
Status: COMPLETED | OUTPATIENT
Start: 2022-08-02 | End: 2022-08-02

## 2022-08-02 RX ORDER — ONDANSETRON 2 MG/ML
4 INJECTION INTRAMUSCULAR; INTRAVENOUS ONCE
Status: COMPLETED | OUTPATIENT
Start: 2022-08-02 | End: 2022-08-02

## 2022-08-02 RX ORDER — OCTREOTIDE ACETATE 50 UG/ML
50 INJECTION, SOLUTION INTRAVENOUS; SUBCUTANEOUS
Status: DISCONTINUED | OUTPATIENT
Start: 2022-08-02 | End: 2022-08-02 | Stop reason: DRUGHIGH

## 2022-08-02 RX ADMIN — SODIUM CHLORIDE 80 MG: 9 INJECTION, SOLUTION INTRAMUSCULAR; INTRAVENOUS; SUBCUTANEOUS at 18:00

## 2022-08-02 RX ADMIN — MORPHINE SULFATE 4 MG: 4 INJECTION, SOLUTION INTRAMUSCULAR; INTRAVENOUS at 18:27

## 2022-08-02 RX ADMIN — SODIUM CHLORIDE 1000 ML: 9 INJECTION, SOLUTION INTRAVENOUS at 18:31

## 2022-08-02 RX ADMIN — OCTREOTIDE ACETATE 50 MCG/HR: 500 INJECTION, SOLUTION INTRAVENOUS; SUBCUTANEOUS at 19:39

## 2022-08-02 RX ADMIN — ONDANSETRON 4 MG: 2 INJECTION INTRAMUSCULAR; INTRAVENOUS at 18:29

## 2022-08-02 RX ADMIN — WATER 1 G: 1 INJECTION INTRAMUSCULAR; INTRAVENOUS; SUBCUTANEOUS at 18:33

## 2022-08-02 NOTE — ED PROVIDER NOTES
EMERGENCY DEPARTMENT HISTORY AND PHYSICAL EXAM    6:19 PM      Date: 8/2/2022  Patient Name: Milda Buerger    History of Presenting Illness     Chief Complaint   Patient presents with    Abdominal Pain         History Provided By: Patient  Location/Duration/Severity/Modifying factors   Patient is a 58-year-old female with a history of liver cirrhosis from prior hep C, esophageal varices with prior banding, prior esophageal bleeding, bipolar, and multi substance abuse presenting due to hematemesis. Patient states that roughly around 230 today/4 hours earlier patient had a nosebleed and then 2 hours after that she had multiple episodes of hematemesis associated with right upper quadrant abdominal pain. Patient states that she is staying at [facility] and due to the symptoms she was sent here for eval.  Patient states that she was recently in the hospital few weeks ago for her bipolar disease and also some with emesis at the time. Patient states that she has had banding of her varices done in the past and also has needed transfusions and admission to the ICU prior. Patient mostly concerned about her abdominal pain at this time but does state having some lightheadedness and headache. Patient denies any syncope.       PCP: Karin Canavan, MD    Current Facility-Administered Medications   Medication Dose Route Frequency Provider Last Rate Last Admin    ondansetron (ZOFRAN) injection 4 mg  4 mg IntraVENous ONCE Bonnie Grace MD        sodium chloride 0.9 % bolus infusion 1,000 mL  1,000 mL IntraVENous ONCE Bonnie Grace MD        morphine injection 4 mg  4 mg IntraVENous ONCE Bonnie Grace MD        octreotide (SANDOSTATIN) 500 mcg in 0.9% sodium chloride 500 mL infusion  50 mcg IntraVENous NOW Bonnie Grace MD        cefTRIAXone (ROCEPHIN) 1 g in sterile water (preservative free) 10 mL IV syringe  1 g IntraVENous Victorina Amor MD         Current Outpatient Medications Medication Sig Dispense Refill    furosemide (LASIX) 40 mg tablet Take 2 Tabs by mouth daily. 60 Tab 0    spironolactone (ALDACTONE) 100 mg tablet Take 2 Tabs by mouth daily. 60 Tab 0    naloxone (Narcan) 4 mg/actuation nasal spray Use 1 spray intranasally, then discard. Repeat with new spray every 2 min as needed for opioid overdose symptoms, alternating nostrils. 2 Each 0       Past History     Past Medical History:  Past Medical History:   Diagnosis Date    Alcohol use     Bipolar 2 disorder ([facility]     Hepatitis C     Heroin abuse ([facility]     PTSD (post-traumatic stress disorder)        Past Surgical History:  Past Surgical History:   Procedure Laterality Date    HX OTHER SURGICAL  08/2020    EGD with variceal banding       Family History:  Family History   Problem Relation Age of Onset    Heart Disease Mother     Cancer Father        Social History:  Social History     Tobacco Use    Smoking status: Every Day     Packs/day: 0.50     Years: 15.00     Pack years: 7.50     Types: Cigarettes    Smokeless tobacco: Never   Substance Use Topics    Alcohol use: Yes     Alcohol/week: 1.0 standard drink     Types: 1 Cans of beer per week    Drug use: Yes     Frequency: 4.0 times per week     Types: Heroin     Comment: inhaled       Allergies:  No Known Allergies      Review of Systems       Review of Systems   Constitutional:  Negative for chills and fever. HENT:  Positive for nosebleeds. Hematemesis   Respiratory:  Negative for chest tightness and shortness of breath. Cardiovascular:  Negative for chest pain. Gastrointestinal:  Positive for abdominal pain, nausea and vomiting. Genitourinary:  Negative for difficulty urinating and dysuria. Skin: Negative. Neurological:  Positive for light-headedness and headaches. Negative for syncope.        Physical Exam   Visit Vitals  BP (!) 140/86 (BP 1 Location: Left upper arm, BP Patient Position: At rest;Semi fowlers)   Pulse (!) 111   Temp 97.8 °F (36.6 °C) Resp 24   SpO2 100%         Physical Exam  Vitals and nursing note reviewed. Constitutional:       General: She is not in acute distress. Appearance: She is well-developed. HENT:      Head: Normocephalic and atraumatic. Eyes:      Extraocular Movements: Extraocular movements intact. Neck:      Thyroid: No thyromegaly. Vascular: No JVD. Trachea: No tracheal deviation. Cardiovascular:      Rate and Rhythm: Regular rhythm. Tachycardia present. Heart sounds: Normal heart sounds. No murmur heard. No gallop. Pulmonary:      Effort: Pulmonary effort is normal.      Breath sounds: Normal breath sounds. Chest:      Chest wall: No tenderness. Abdominal:      General: Bowel sounds are normal. There is no distension. Palpations: Abdomen is soft. There is no shifting dullness or fluid wave. Tenderness: There is abdominal tenderness in the right upper quadrant. There is no guarding or rebound. Musculoskeletal:         General: Normal range of motion. Cervical back: Normal range of motion and neck supple. Skin:     General: Skin is warm and dry. Neurological:      General: No focal deficit present. Mental Status: She is alert and oriented to person, place, and time. Psychiatric:         Behavior: Behavior normal.         Thought Content:  Thought content normal.         Judgment: Judgment normal.         Diagnostic Study Results     Labs -  Recent Results (from the past 12 hour(s))   CBC WITH AUTOMATED DIFF    Collection Time: 08/02/22  5:40 PM   Result Value Ref Range    WBC 9.7 4.6 - 13.2 K/uL    RBC 5.46 (H) 4.20 - 5.30 M/uL    HGB 15.1 12.0 - 16.0 g/dL    HCT 45.3 (H) 35.0 - 45.0 %    MCV 83.0 78.0 - 100.0 FL    MCH 27.7 24.0 - 34.0 PG    MCHC 33.3 31.0 - 37.0 g/dL    RDW 14.6 (H) 11.6 - 14.5 %    PLATELET PENDING K/uL    NRBC 0.0 0  WBC    ABSOLUTE NRBC 0.00 0.00 - 0.01 K/uL    NEUTROPHILS PENDING %    LYMPHOCYTES PENDING %    MONOCYTES PENDING % EOSINOPHILS PENDING %    BASOPHILS PENDING %    IMMATURE GRANULOCYTES PENDING %    ABS. NEUTROPHILS PENDING K/UL    ABS. LYMPHOCYTES PENDING K/UL    ABS. MONOCYTES PENDING K/UL    ABS. EOSINOPHILS PENDING K/UL    ABS. BASOPHILS PENDING K/UL    ABS. IMM. GRANS. PENDING K/UL    DF PENDING    PROTHROMBIN TIME + INR    Collection Time: 08/02/22  5:40 PM   Result Value Ref Range    Prothrombin time 14.0 11.5 - 15.2 sec    INR 1.0 0.8 - 1.2     POC LACTIC ACID    Collection Time: 08/02/22  5:55 PM   Result Value Ref Range    Lactic Acid (POC) 2.60 (HH) 0.40 - 2.00 mmol/L       Radiologic Studies -   CT HEAD WO CONT    (Results Pending)   CT ABD PELV WO CONT    (Results Pending)         Medical Decision Making   I am the first provider for this patient. I reviewed the vital signs, available nursing notes, past medical history, past surgical history, family history and social history. Vital Signs-Reviewed the patient's vital signs. EKG:     Records Reviewed: Nursing Notes (Time of Review: 6:19 PM)    ED Course: Progress Notes, Reevaluation, and Consults:         Provider Notes (Medical Decision Making):   MDM  Number of Diagnoses or Management Options  Diagnosis management comments: Patient is a 78-year-old female with a history of liver cirrhosis from prior hep C, esophageal varices with prior banding, prior esophageal bleeding, bipolar, and multi substance abuse presenting due to hematemesis. Presenting with hematemesis likely secondary to known varices. Patient also with right upper quadrant pain which could be secondary to cirrhosis, pancreatitis, cholelithiasis/cholecystitis or other intra or abdominal pathology. Plan to assess with CBC CMP lipase PT/INR. We will try to treat symptoms with morphine, IV fluids, ceftriaxone, pantoprazole, octreotide. We will consult GI likely admission to stepdown unit as patient tachycardic with elevated lactic at this time.         Procedures    Critical Care Time: 30      Diagnosis     Clinical Impression: No diagnosis found. Disposition: Admission    Follow-up Information    None          Patient's Medications   Start Taking    No medications on file   Continue Taking    FUROSEMIDE (LASIX) 40 MG TABLET    Take 2 Tabs by mouth daily. NALOXONE (NARCAN) 4 MG/ACTUATION NASAL SPRAY    Use 1 spray intranasally, then discard. Repeat with new spray every 2 min as needed for opioid overdose symptoms, alternating nostrils. SPIRONOLACTONE (ALDACTONE) 100 MG TABLET    Take 2 Tabs by mouth daily. These Medications have changed    No medications on file   Stop Taking    No medications on file     Disclaimer: Sections of this note are dictated using utilizing voice recognition software. Minor typographical errors may be present. If questions arise, please do not hesitate to contact me or call our department.

## 2022-08-02 NOTE — Clinical Note
Status[de-identified] INPATIENT [101]   Type of Bed: Telemetry [19]   Cardiac Monitoring Required?: Yes   Inpatient Hospitalization Certified Necessary for the Following Reasons: 3. Patient receiving treatment that can only be provided in an inpatient setting (further clarification in H&P documentation)   Admitting Diagnosis: Esophageal and gastric varices ([facility] [875222]   Admitting Diagnosis: Hematemesis [578. 0. ICD-9-CM]   Admitting Physician: Juan Carlos Guadarrama [[id number]]   Attending Physician: Juan Carlos Guadarrama [[id number]]   Estimated Length of Stay: 2 Midnights   Discharge Plan[de-identified] Home with Office Follow-up

## 2022-08-02 NOTE — ED TRIAGE NOTES
Brought in by EMS via stretcher to Room 8. Patient complains of feeling light headed, witnessed vomiting red blood, and almost passed out. Complains of nose bleed at 2pm today. Alert and oriented x 4, 9/10 pain, upper right quadrant, stabbing pan. Found to be cool and clammy to touch. Heart rate 104. Placed on 2L nasal cannula, 97%. Blood pressure good.

## 2022-08-03 ENCOUNTER — ANESTHESIA EVENT (OUTPATIENT)
Dept: ENDOSCOPY | Age: 62
DRG: 432 | End: 2022-08-03
Payer: MEDICARE

## 2022-08-03 ENCOUNTER — ANESTHESIA (OUTPATIENT)
Dept: ENDOSCOPY | Age: 62
DRG: 432 | End: 2022-08-03
Payer: MEDICARE

## 2022-08-03 PROBLEM — F43.10 PTSD (POST-TRAUMATIC STRESS DISORDER): Chronic | Status: ACTIVE | Noted: 2020-08-10

## 2022-08-03 PROBLEM — I86.4 ESOPHAGEAL AND GASTRIC VARICES (HCC): Status: ACTIVE | Noted: 2022-08-03

## 2022-08-03 PROBLEM — K74.60 CIRRHOSIS OF LIVER (HCC): Chronic | Status: ACTIVE | Noted: 2020-08-10

## 2022-08-03 PROBLEM — I85.00 ESOPHAGEAL AND GASTRIC VARICES (HCC): Status: ACTIVE | Noted: 2022-08-03

## 2022-08-03 PROBLEM — F31.9 BIPOLAR MOOD DISORDER (HCC): Chronic | Status: ACTIVE | Noted: 2022-08-03

## 2022-08-03 PROBLEM — K92.0 HEMATEMESIS: Status: ACTIVE | Noted: 2022-08-03

## 2022-08-03 PROBLEM — F11.10 HEROIN ABUSE (HCC): Chronic | Status: ACTIVE | Noted: 2020-08-10

## 2022-08-03 PROBLEM — K74.60 CIRRHOSIS (HCC): Status: ACTIVE | Noted: 2022-08-03

## 2022-08-03 PROBLEM — R04.0 EPISTAXIS: Status: ACTIVE | Noted: 2022-08-03

## 2022-08-03 PROBLEM — Z72.0 TOBACCO ABUSE: Chronic | Status: ACTIVE | Noted: 2022-08-03

## 2022-08-03 PROBLEM — Z78.9 ALCOHOL USE: Chronic | Status: ACTIVE | Noted: 2020-08-10

## 2022-08-03 LAB
ALBUMIN SERPL-MCNC: 2.7 G/DL (ref 3.4–5)
ALBUMIN/GLOB SERPL: 0.7 {RATIO} (ref 0.8–1.7)
ALP SERPL-CCNC: 119 U/L (ref 45–117)
ALT SERPL-CCNC: 22 U/L (ref 13–56)
AMPHET UR QL SCN: NEGATIVE
ANION GAP SERPL CALC-SCNC: 8 MMOL/L (ref 3–18)
AST SERPL-CCNC: 24 U/L (ref 10–38)
ATRIAL RATE: 107 BPM
BARBITURATES UR QL SCN: NEGATIVE
BASOPHILS # BLD: 0 K/UL (ref 0–0.1)
BASOPHILS NFR BLD: 0 % (ref 0–2)
BENZODIAZ UR QL: NEGATIVE
BILIRUB SERPL-MCNC: 0.6 MG/DL (ref 0.2–1)
BUN SERPL-MCNC: 14 MG/DL (ref 7–18)
BUN/CREAT SERPL: 14 (ref 12–20)
CALCIUM SERPL-MCNC: 8.3 MG/DL (ref 8.5–10.1)
CALCULATED P AXIS, ECG09: 69 DEGREES
CALCULATED R AXIS, ECG10: 26 DEGREES
CALCULATED T AXIS, ECG11: 57 DEGREES
CANNABINOIDS UR QL SCN: NEGATIVE
CHLORIDE SERPL-SCNC: 111 MMOL/L (ref 100–111)
CO2 SERPL-SCNC: 25 MMOL/L (ref 21–32)
COCAINE UR QL SCN: NEGATIVE
CREAT SERPL-MCNC: 0.99 MG/DL (ref 0.6–1.3)
DIAGNOSIS, 93000: NORMAL
DIFFERENTIAL METHOD BLD: ABNORMAL
EOSINOPHIL # BLD: 0.1 K/UL (ref 0–0.4)
EOSINOPHIL NFR BLD: 2 % (ref 0–5)
ERYTHROCYTE [DISTWIDTH] IN BLOOD BY AUTOMATED COUNT: 14.5 % (ref 11.6–14.5)
GLOBULIN SER CALC-MCNC: 4 G/DL (ref 2–4)
GLUCOSE BLD STRIP.AUTO-MCNC: 163 MG/DL (ref 70–110)
GLUCOSE SERPL-MCNC: 198 MG/DL (ref 74–99)
HCG UR QL: NEGATIVE
HCT VFR BLD AUTO: 30 % (ref 35–45)
HCT VFR BLD AUTO: 32.4 % (ref 35–45)
HDSCOM,HDSCOM: ABNORMAL
HGB BLD-MCNC: 10.1 G/DL (ref 12–16)
HGB BLD-MCNC: 10.4 G/DL (ref 12–16)
IMM GRANULOCYTES # BLD AUTO: 0 K/UL (ref 0–0.04)
IMM GRANULOCYTES NFR BLD AUTO: 0 % (ref 0–0.5)
LYMPHOCYTES # BLD: 1 K/UL (ref 0.9–3.6)
LYMPHOCYTES NFR BLD: 28 % (ref 21–52)
MCH RBC QN AUTO: 27.7 PG (ref 24–34)
MCHC RBC AUTO-ENTMCNC: 32.1 G/DL (ref 31–37)
MCV RBC AUTO: 86.4 FL (ref 78–100)
METHADONE UR QL: NEGATIVE
MONOCYTES # BLD: 0.3 K/UL (ref 0.05–1.2)
MONOCYTES NFR BLD: 9 % (ref 3–10)
NEUTS SEG # BLD: 2.2 K/UL (ref 1.8–8)
NEUTS SEG NFR BLD: 61 % (ref 40–73)
NRBC # BLD: 0 K/UL (ref 0–0.01)
NRBC BLD-RTO: 0 PER 100 WBC
OPIATES UR QL: POSITIVE
P-R INTERVAL, ECG05: 126 MS
PCP UR QL: NEGATIVE
PLATELET # BLD AUTO: 49 K/UL (ref 135–420)
PLATELET COMMENTS,PCOM: ABNORMAL
PMV BLD AUTO: 12 FL (ref 9.2–11.8)
POTASSIUM SERPL-SCNC: 3.9 MMOL/L (ref 3.5–5.5)
PROT SERPL-MCNC: 6.7 G/DL (ref 6.4–8.2)
Q-T INTERVAL, ECG07: 364 MS
QRS DURATION, ECG06: 68 MS
QTC CALCULATION (BEZET), ECG08: 485 MS
RBC # BLD AUTO: 3.75 M/UL (ref 4.2–5.3)
RBC MORPH BLD: ABNORMAL
SODIUM SERPL-SCNC: 144 MMOL/L (ref 136–145)
VENTRICULAR RATE, ECG03: 107 BPM
WBC # BLD AUTO: 3.6 K/UL (ref 4.6–13.2)

## 2022-08-03 PROCEDURE — 77030008565 HC TBNG SUC IRR ERBE -B: Performed by: STUDENT IN AN ORGANIZED HEALTH CARE EDUCATION/TRAINING PROGRAM

## 2022-08-03 PROCEDURE — 74011250636 HC RX REV CODE- 250/636: Performed by: NURSE ANESTHETIST, CERTIFIED REGISTERED

## 2022-08-03 PROCEDURE — 2709999900 HC NON-CHARGEABLE SUPPLY: Performed by: STUDENT IN AN ORGANIZED HEALTH CARE EDUCATION/TRAINING PROGRAM

## 2022-08-03 PROCEDURE — C9113 INJ PANTOPRAZOLE SODIUM, VIA: HCPCS | Performed by: INTERNAL MEDICINE

## 2022-08-03 PROCEDURE — 80053 COMPREHEN METABOLIC PANEL: CPT

## 2022-08-03 PROCEDURE — 74011000250 HC RX REV CODE- 250: Performed by: INTERNAL MEDICINE

## 2022-08-03 PROCEDURE — 77030008683 HC TU ET CUF COVD -A: Performed by: ANESTHESIOLOGY

## 2022-08-03 PROCEDURE — 76040000019: Performed by: STUDENT IN AN ORGANIZED HEALTH CARE EDUCATION/TRAINING PROGRAM

## 2022-08-03 PROCEDURE — 85025 COMPLETE CBC W/AUTO DIFF WBC: CPT

## 2022-08-03 PROCEDURE — 74011000250 HC RX REV CODE- 250: Performed by: ANESTHESIOLOGY

## 2022-08-03 PROCEDURE — 00731 ANES UPR GI NDSC PX NOS: CPT | Performed by: NURSE ANESTHETIST, CERTIFIED REGISTERED

## 2022-08-03 PROCEDURE — 77030014243 HC BND LIG VRCES BSC -D: Performed by: STUDENT IN AN ORGANIZED HEALTH CARE EDUCATION/TRAINING PROGRAM

## 2022-08-03 PROCEDURE — 36415 COLL VENOUS BLD VENIPUNCTURE: CPT

## 2022-08-03 PROCEDURE — 85018 HEMOGLOBIN: CPT

## 2022-08-03 PROCEDURE — 74011000250 HC RX REV CODE- 250: Performed by: NURSE ANESTHETIST, CERTIFIED REGISTERED

## 2022-08-03 PROCEDURE — 76060000031 HC ANESTHESIA FIRST 0.5 HR: Performed by: STUDENT IN AN ORGANIZED HEALTH CARE EDUCATION/TRAINING PROGRAM

## 2022-08-03 PROCEDURE — 74011250636 HC RX REV CODE- 250/636: Performed by: INTERNAL MEDICINE

## 2022-08-03 PROCEDURE — 82962 GLUCOSE BLOOD TEST: CPT

## 2022-08-03 PROCEDURE — 65270000046 HC RM TELEMETRY

## 2022-08-03 PROCEDURE — 00731 ANES UPR GI NDSC PX NOS: CPT | Performed by: ANESTHESIOLOGY

## 2022-08-03 PROCEDURE — 74011636637 HC RX REV CODE- 636/637: Performed by: STUDENT IN AN ORGANIZED HEALTH CARE EDUCATION/TRAINING PROGRAM

## 2022-08-03 PROCEDURE — 74011250636 HC RX REV CODE- 250/636: Performed by: STUDENT IN AN ORGANIZED HEALTH CARE EDUCATION/TRAINING PROGRAM

## 2022-08-03 PROCEDURE — 99223 1ST HOSP IP/OBS HIGH 75: CPT | Performed by: INTERNAL MEDICINE

## 2022-08-03 PROCEDURE — 74011250636 HC RX REV CODE- 250/636: Performed by: ANESTHESIOLOGY

## 2022-08-03 PROCEDURE — 77030026438 HC STYL ET INTUB CARD -A: Performed by: ANESTHESIOLOGY

## 2022-08-03 PROCEDURE — 06L38CZ OCCLUSION OF ESOPHAGEAL VEIN WITH EXTRALUMINAL DEVICE, VIA NATURAL OR ARTIFICIAL OPENING ENDOSCOPIC: ICD-10-PCS | Performed by: STUDENT IN AN ORGANIZED HEALTH CARE EDUCATION/TRAINING PROGRAM

## 2022-08-03 RX ORDER — FAMOTIDINE 20 MG/1
20 TABLET, FILM COATED ORAL ONCE
Status: DISCONTINUED | OUTPATIENT
Start: 2022-08-03 | End: 2022-08-03 | Stop reason: HOSPADM

## 2022-08-03 RX ORDER — SODIUM CHLORIDE 0.9 % (FLUSH) 0.9 %
5-40 SYRINGE (ML) INJECTION EVERY 8 HOURS
Status: DISCONTINUED | OUTPATIENT
Start: 2022-08-03 | End: 2022-08-05 | Stop reason: HOSPADM

## 2022-08-03 RX ORDER — POLYETHYLENE GLYCOL 3350 17 G/17G
17 POWDER, FOR SOLUTION ORAL DAILY PRN
Status: DISCONTINUED | OUTPATIENT
Start: 2022-08-03 | End: 2022-08-05 | Stop reason: HOSPADM

## 2022-08-03 RX ORDER — IPRATROPIUM BROMIDE AND ALBUTEROL SULFATE 2.5; .5 MG/3ML; MG/3ML
3 SOLUTION RESPIRATORY (INHALATION)
Status: DISCONTINUED | OUTPATIENT
Start: 2022-08-03 | End: 2022-08-05 | Stop reason: HOSPADM

## 2022-08-03 RX ORDER — ONDANSETRON 2 MG/ML
INJECTION INTRAMUSCULAR; INTRAVENOUS AS NEEDED
Status: DISCONTINUED | OUTPATIENT
Start: 2022-08-03 | End: 2022-08-03 | Stop reason: HOSPADM

## 2022-08-03 RX ORDER — MORPHINE SULFATE 2 MG/ML
2-4 INJECTION, SOLUTION INTRAMUSCULAR; INTRAVENOUS
Status: DISCONTINUED | OUTPATIENT
Start: 2022-08-03 | End: 2022-08-05 | Stop reason: HOSPADM

## 2022-08-03 RX ORDER — SODIUM CHLORIDE, SODIUM LACTATE, POTASSIUM CHLORIDE, CALCIUM CHLORIDE 600; 310; 30; 20 MG/100ML; MG/100ML; MG/100ML; MG/100ML
INJECTION, SOLUTION INTRAVENOUS
Status: DISCONTINUED | OUTPATIENT
Start: 2022-08-03 | End: 2022-08-03 | Stop reason: HOSPADM

## 2022-08-03 RX ORDER — DEXTROSE MONOHYDRATE 50 MG/ML
50 INJECTION, SOLUTION INTRAVENOUS CONTINUOUS
Status: DISCONTINUED | OUTPATIENT
Start: 2022-08-03 | End: 2022-08-04

## 2022-08-03 RX ORDER — LIDOCAINE HYDROCHLORIDE 20 MG/ML
INJECTION, SOLUTION EPIDURAL; INFILTRATION; INTRACAUDAL; PERINEURAL AS NEEDED
Status: DISCONTINUED | OUTPATIENT
Start: 2022-08-03 | End: 2022-08-03 | Stop reason: HOSPADM

## 2022-08-03 RX ORDER — FENTANYL CITRATE 50 UG/ML
INJECTION, SOLUTION INTRAMUSCULAR; INTRAVENOUS AS NEEDED
Status: DISCONTINUED | OUTPATIENT
Start: 2022-08-03 | End: 2022-08-03 | Stop reason: HOSPADM

## 2022-08-03 RX ORDER — SODIUM CHLORIDE 0.9 % (FLUSH) 0.9 %
5-40 SYRINGE (ML) INJECTION AS NEEDED
Status: DISCONTINUED | OUTPATIENT
Start: 2022-08-03 | End: 2022-08-05 | Stop reason: HOSPADM

## 2022-08-03 RX ORDER — CHOLECALCIFEROL (VITAMIN D3) 125 MCG
5 CAPSULE ORAL
Status: DISCONTINUED | OUTPATIENT
Start: 2022-08-03 | End: 2022-08-04

## 2022-08-03 RX ORDER — PANTOPRAZOLE SODIUM 40 MG/10ML
40 INJECTION, POWDER, LYOPHILIZED, FOR SOLUTION INTRAVENOUS EVERY 12 HOURS
Status: DISCONTINUED | OUTPATIENT
Start: 2022-08-03 | End: 2022-08-05 | Stop reason: HOSPADM

## 2022-08-03 RX ORDER — PROPOFOL 10 MG/ML
INJECTION, EMULSION INTRAVENOUS AS NEEDED
Status: DISCONTINUED | OUTPATIENT
Start: 2022-08-03 | End: 2022-08-03 | Stop reason: HOSPADM

## 2022-08-03 RX ORDER — IBUPROFEN 200 MG
1 TABLET ORAL
Status: DISCONTINUED | OUTPATIENT
Start: 2022-08-03 | End: 2022-08-05 | Stop reason: HOSPADM

## 2022-08-03 RX ORDER — SODIUM CHLORIDE, SODIUM LACTATE, POTASSIUM CHLORIDE, CALCIUM CHLORIDE 600; 310; 30; 20 MG/100ML; MG/100ML; MG/100ML; MG/100ML
75 INJECTION, SOLUTION INTRAVENOUS CONTINUOUS
Status: DISCONTINUED | OUTPATIENT
Start: 2022-08-03 | End: 2022-08-03 | Stop reason: HOSPADM

## 2022-08-03 RX ORDER — NALOXONE HYDROCHLORIDE 0.4 MG/ML
0.4 INJECTION, SOLUTION INTRAMUSCULAR; INTRAVENOUS; SUBCUTANEOUS
Status: DISCONTINUED | OUTPATIENT
Start: 2022-08-03 | End: 2022-08-05 | Stop reason: HOSPADM

## 2022-08-03 RX ORDER — ONDANSETRON 4 MG/1
4 TABLET, ORALLY DISINTEGRATING ORAL
Status: DISCONTINUED | OUTPATIENT
Start: 2022-08-03 | End: 2022-08-05 | Stop reason: HOSPADM

## 2022-08-03 RX ORDER — ONDANSETRON 2 MG/ML
4 INJECTION INTRAMUSCULAR; INTRAVENOUS
Status: DISCONTINUED | OUTPATIENT
Start: 2022-08-03 | End: 2022-08-03 | Stop reason: HOSPADM

## 2022-08-03 RX ORDER — SUCCINYLCHOLINE CHLORIDE 20 MG/ML
INJECTION INTRAMUSCULAR; INTRAVENOUS AS NEEDED
Status: DISCONTINUED | OUTPATIENT
Start: 2022-08-03 | End: 2022-08-03 | Stop reason: HOSPADM

## 2022-08-03 RX ORDER — ONDANSETRON 2 MG/ML
4 INJECTION INTRAMUSCULAR; INTRAVENOUS
Status: DISCONTINUED | OUTPATIENT
Start: 2022-08-03 | End: 2022-08-05 | Stop reason: HOSPADM

## 2022-08-03 RX ORDER — ACETAMINOPHEN 650 MG/1
650 SUPPOSITORY RECTAL
Status: DISCONTINUED | OUTPATIENT
Start: 2022-08-03 | End: 2022-08-05 | Stop reason: HOSPADM

## 2022-08-03 RX ORDER — ACETAMINOPHEN 325 MG/1
650 TABLET ORAL
Status: DISCONTINUED | OUTPATIENT
Start: 2022-08-03 | End: 2022-08-05 | Stop reason: HOSPADM

## 2022-08-03 RX ADMIN — PROPOFOL 150 MG: 10 INJECTION, EMULSION INTRAVENOUS at 10:44

## 2022-08-03 RX ADMIN — SODIUM CHLORIDE, PRESERVATIVE FREE 10 ML: 5 INJECTION INTRAVENOUS at 22:25

## 2022-08-03 RX ADMIN — WATER 2 G: 1 INJECTION INTRAMUSCULAR; INTRAVENOUS; SUBCUTANEOUS at 18:39

## 2022-08-03 RX ADMIN — OCTREOTIDE ACETATE 50 MCG/HR: 500 INJECTION, SOLUTION INTRAVENOUS; SUBCUTANEOUS at 22:25

## 2022-08-03 RX ADMIN — PANTOPRAZOLE SODIUM 40 MG: 40 INJECTION, POWDER, FOR SOLUTION INTRAVENOUS at 22:25

## 2022-08-03 RX ADMIN — MORPHINE SULFATE 2 MG: 2 INJECTION, SOLUTION INTRAMUSCULAR; INTRAVENOUS at 06:11

## 2022-08-03 RX ADMIN — DEXTROSE MONOHYDRATE 50 ML/HR: 5 INJECTION, SOLUTION INTRAVENOUS at 01:04

## 2022-08-03 RX ADMIN — PANTOPRAZOLE SODIUM 40 MG: 40 INJECTION, POWDER, FOR SOLUTION INTRAVENOUS at 13:00

## 2022-08-03 RX ADMIN — SODIUM CHLORIDE, SODIUM LACTATE, POTASSIUM CHLORIDE, AND CALCIUM CHLORIDE: 600; 310; 30; 20 INJECTION, SOLUTION INTRAVENOUS at 10:40

## 2022-08-03 RX ADMIN — PROPOFOL 50 MG: 10 INJECTION, EMULSION INTRAVENOUS at 10:41

## 2022-08-03 RX ADMIN — FENTANYL CITRATE 50 MCG: 50 INJECTION, SOLUTION INTRAMUSCULAR; INTRAVENOUS at 10:46

## 2022-08-03 RX ADMIN — LIDOCAINE HYDROCHLORIDE 50 MG: 20 INJECTION, SOLUTION EPIDURAL; INFILTRATION; INTRACAUDAL; PERINEURAL at 10:44

## 2022-08-03 RX ADMIN — SUCCINYLCHOLINE CHLORIDE 80 MG: 20 INJECTION, SOLUTION INTRAMUSCULAR; INTRAVENOUS at 10:44

## 2022-08-03 RX ADMIN — ONDANSETRON 4 MG: 2 INJECTION INTRAMUSCULAR; INTRAVENOUS at 11:01

## 2022-08-03 RX ADMIN — OCTREOTIDE ACETATE 50 MCG/HR: 500 INJECTION, SOLUTION INTRAVENOUS; SUBCUTANEOUS at 05:51

## 2022-08-03 RX ADMIN — MORPHINE SULFATE 2 MG: 2 INJECTION, SOLUTION INTRAMUSCULAR; INTRAVENOUS at 18:37

## 2022-08-03 RX ADMIN — MORPHINE SULFATE 2 MG: 2 INJECTION, SOLUTION INTRAMUSCULAR; INTRAVENOUS at 12:54

## 2022-08-03 RX ADMIN — FAMOTIDINE 20 MG: 10 INJECTION, SOLUTION INTRAVENOUS at 09:54

## 2022-08-03 RX ADMIN — SODIUM CHLORIDE, PRESERVATIVE FREE 10 ML: 5 INJECTION INTRAVENOUS at 06:12

## 2022-08-03 NOTE — PROCEDURES
[email or web address] [address]  [address], [address]     Endoscopic Gastroduodenoscopy Procedure Note    Haleigh Rivero  1960  [id number]    Indication:  Hematemesis      : Parris Zaidi MD    Referring Provider:  Casey Mcgee MD    Anesthesia/Sedation:  MAC anesthesia Propofol      Procedure Details   Full disclosure of risks were reviewed with patient as detailed on the consent form. The patient was placed in the left lateral decubitus position and monitored with continuous interval blood pressure monitoring and direct observations. After adequate sedation, the endoscope was carefully introduced into the oropharynx and passed in to the esophagus under direct visualization. The esophagus and GE junction were carefully examined, including a measurement of the Z-line at 41 cm, GEJ at 41 cm and hiatus at 41 cm from the incisors. After advancement of the endoscope into the stomach, a careful examination was performed, including views of the antrum, incisura angularis, corpus and retroflexed views of the cardia and fundus. The pylorus was then intubated without any difficulty and the endoscope was advanced to the second portion of the duodenum. Careful examination of the second portion of the duodenum and the bulb was then performed. The stomach was then decompressed and the endoscope withdrawn. Findings and intervention(s) are detailed below. Findings:   Esophagus:  Two columns of large varices, one with red ana sign, where she probably bled from. Two rubber bands were placed, with appropriate flattening of both varices. Stomach: Insufflated appropriately  Moderate to severe PHG  Gastric antrum appeared normal mucosa without evidence of erythema, erosion or ulceration  Retroflexed view of the incisura and cardia revealed normal mucosa.  No gastric varices    Duodenum/small bowel:  Examination into second portion of duodenum  Mucosa appeared normal without evidence of erythema, erosion or ulceration    Therapies:    variceal banding    Specimens: * No specimens *    Complications:   none; patient tolerated the procedure well. EBL: 0 ml    Impression:  Moderate to severe PHG  Two columns of large varices, one with red ana sign, where she probably bled from. Two bands were placed, with appropriate flattening of both varices. Recommendations:  Ok to start liquid diet. Can advance in 24 hours if H/H remains stable. Avoid extreme of temperatures. Carafate slurry QID for the next 2 weeks. Complete 3-5 days of antibiotics for SBP prophylaxis. Can wean octreotide when 72 hours have been completed. Repeat EGD in 4-6 weeks to confirm eradication of varices. This can be with us or her Hepatologists at [facility] (Dr Baltazar Mora and Dr Jessie Centeno)  Needs close outpatient follow up with her outpatient Hepatologists after discharge, to consider liver transplantation.     Denae Ibrahim MD  8/3/2022  11:02 AM

## 2022-08-03 NOTE — PERIOP NOTES
TRANSFER - IN REPORT:    Verbal report received from [facility]) on Therman Sunil  being received from 214(unit) for Ordered emergent procedure    Report consisted of patients Situation, Background, Assessment and   Recommendations(SBAR). Information from the following report(s) SBAR, MAR, and Recent Results was reviewed with the receiving nurse. Opportunity for questions and clarification was provided. Assessment completed upon patients arrival to unit and care assumed.

## 2022-08-03 NOTE — ANESTHESIA PREPROCEDURE EVALUATION
Relevant Problems   NEUROLOGY   (+) Bipolar mood disorder (HCC)   (+) PTSD (post-traumatic stress disorder)      GASTROINTESTINAL   (+) Cirrhosis (HCC)   (+) Cirrhosis of liver (HCC)   (+) Hepatitis C   (+) Liver failure (HCC)      HEMATOLOGY   (+) Anemia       Anesthetic History     PONV          Review of Systems / Medical History  Patient summary reviewed and pertinent labs reviewed    Pulmonary          Shortness of breath and smoker         Neuro/Psych         Psychiatric history    Comments: PSA  PTSD  BIPOLAR Cardiovascular      Valvular problems/murmurs: tricuspid insufficiency, mitral insufficiency and aortic insufficiency        Hyperlipidemia    Exercise tolerance: <4 METS     GI/Hepatic/Renal       Hepatitis: type C    Liver disease    Comments: CIRRHOSIS  VARICIES  UGIB Endo/Other        Anemia     Other Findings              Physical Exam    Airway  Mallampati: III  TM Distance: > 6 cm  Neck ROM: normal range of motion   Mouth opening: Normal     Cardiovascular          Murmur: Grade 2, Aortic area     Dental    Dentition: Poor dentition     Pulmonary      Decreased breath sounds: bilateral           Abdominal  GI exam deferred       Other Findings            Anesthetic Plan    ASA: 4  Anesthesia type: general          Induction: Intravenous  Anesthetic plan and risks discussed with: Patient

## 2022-08-03 NOTE — ROUTINE PROCESS
TRANSFER - OUT REPORT:    Verbal report given to Amy Torres RN(name) on Francisco Plump  being transferred to [address] [place] [address] (Room: 214)(unit) for routine post - op       Report consisted of patients Situation, Background, Assessment and   Recommendations(SBAR). Information from the following report(s) Kardex, Procedure Summary, MAR, Med Rec Status, and Cardiac Rhythm Normal Sinus Rhythm  was reviewed with the receiving nurse. Lines:   Peripheral IV 08/02/22 Distal;Right;Upper Basilic (Active)   Site Assessment Clean, dry, & intact 08/03/22 1110   Phlebitis Assessment 0 08/03/22 1110   Infiltration Assessment 0 08/03/22 1110   Dressing Status Clean, dry, & intact 08/03/22 1110   Dressing Type Tape;Transparent 08/03/22 1110   Hub Color/Line Status Pink; Infusing;Patent 08/03/22 1110        Opportunity for questions and clarification was provided.       Patient transported with:   [employer]

## 2022-08-03 NOTE — ROUTINE PROCESS
TRANSFER - IN REPORT:    Telephone report received from Nate Lopez RN(name) on Josephine Folds  being received from ED(unit) for routine progression of care      Report consisted of patients Situation, Background, Assessment and   Recommendations(SBAR). Information from the following report(s) SBAR, Kardex, ED Summary, and MAR was reviewed with the receiving nurse. Opportunity for questions and clarification was provided. 0456 Assessment completed upon patients arrival to unit and care assumed.

## 2022-08-03 NOTE — PROGRESS NOTES
Patient seen and evaluated, lying in bed, no acute distress. Patient is status post EGD. 64-year-old female presents to the emergency room secondary to hematemesis. Patient reports 6 episodes of hematemesis that were randy blood. Patient has a known history of alcoholic cirrhosis with esophageal varices and has received blood transfusions in the past secondary to previous GI bleed. Patient is a chronic smoker. Patient seen by GI, underwent EGD which showed moderate to severe PHG. 2 columns of large varices, one with red ana sign, 2 bands were placed. Continue liquid diet. Carafate slurry 4 times daily, can wean octreotide in 72 hours. Repeat EGD in 4 to 6 weeks. Further treatment plan as outlined in H&P, will continue to follow.

## 2022-08-03 NOTE — ED NOTES
TRANSFER - OUT REPORT:    Verbal report given to [employer] (name) on Sheryll Jeans  being transferred to [address] [place] [address] (unit) for routine progression of care       Report consisted of patients Situation, Background, Assessment and   Recommendations(SBAR). Information from the following report(s) SBAR, ED Summary, MAR, Recent Results, and Cardiac Rhythm NSR  was reviewed with the receiving nurse. Lines:   Peripheral IV 08/02/22 Distal;Right;Upper Basilic (Active)        Opportunity for questions and clarification was provided. Patient transported with:   IV fluids.

## 2022-08-03 NOTE — H&P
History and Physical    Patient: Awais Brush MRN: [id number]  SSN: [id number]    YOB: 1960  Age: 59 y.o. Sex: female      Subjective:      Awais Brush is a 59 y.o. female who presents to [facility] ER with complaint of Hematemesis. Patient reports that she had 6 episodes of hematemesis that were \"just blood\" and states that she had recent abdominal pain. Patient states that she has had previous Banding of her Esophageal Varices and has received Blood Transfusions due to previous GI Bleeds. Patient reports that she continue to Smoke 0.30 PPD and has done so for approximately 46 years. Patient reports that she had 2-3 days of diarrhea approximately 10 days ago that has since resolved. Patient denies fevers, chills, dysuria, cough, chest pain, and pain with inspiration. Patient is admitted to [facility] Telemetry Unit for management of Hematemesis with concern for Esophageal Variceal Bleed. Past Medical History:   Diagnosis Date    Alcohol use     Bipolar 2 disorder ([facility] [address]     Hepatitis C     Heroin abuse ([facility]     PTSD (post-traumatic stress disorder)      Past Surgical History:   Procedure Laterality Date    HX OTHER SURGICAL  08/2020    EGD with variceal banding      Family History   Problem Relation Age of Onset    Heart Disease Mother     Cancer Father      Social History     Tobacco Use    Smoking status: Every Day     Packs/day: 0.50     Years: 15.00     Pack years: 7.50     Types: Cigarettes    Smokeless tobacco: Never   Substance Use Topics    Alcohol use: Yes     Alcohol/week: 1.0 standard drink     Types: 1 Cans of beer per week      Prior to Admission medications    Medication Sig Start Date End Date Taking? Authorizing Provider   furosemide (LASIX) 40 mg tablet Take 2 Tabs by mouth daily. 8/12/20   Dawn Mckeon MD   spironolactone (ALDACTONE) 100 mg tablet Take 2 Tabs by mouth daily.  8/13/20   Dawn Mckeon MD   naloxone (Narcan) 4 mg/actuation nasal spray Use 1 spray intranasally, then discard. Repeat with new spray every 2 min as needed for opioid overdose symptoms, alternating nostrils. 8/12/20   Myra Ramírez MD        No Known Allergies    Review of Systems:  (-) Fevers  (-) Chills  (-) Cough  (-) Increased Sputum Production  () Dyspnea on Exertion  (-) Chest Pain  (+) Abdominal Pain  (+) Nausea  (+) Vomiting  (+) Hematemesis  (+) Diarrhea  (-) Dysuria    All other systems have been reviewed and are negative      Objective:     Vitals:    08/02/22 1828 08/02/22 1915 08/02/22 1954 08/02/22 2015   BP: 132/79  137/78 133/79   Pulse: (!) 106  86 88   Resp: 29  19 24   Temp:       SpO2: 100% 100% 100% 100%        Physical Exam:  General:  Older adult female lying in bed in no acute distress  HEENT:  Atraumatic, normocephalic; Pupils equally round and reactive to light with accommodation; Extraocular muscles intact; Moist Oropharynx without erythema, edema, or exudates; (+) Sparse Dentition  Chest:  No pectus carinatum; No pectus excavatum  Cardiovascular:  Regular rate and rhythm without rubs, gallops, or murmurs  Respiratory:  Clear to Auscultation Bilaterally without wheezes, rales, or rhonchi; normal effort of breathing  Abdominal:  Soft, non-tense, non-tender abdomen; BS present without guarding, rebound, or masses  :  Deferred  Extremities:  Pulses 2+ x4 without edema, clubbing, or cyanosis  Musculoskeletal:  Strength 5/5 and symmetrical in BUE and BLE  Integument:  No rash on face, forearms, or legs  Neurological:  Alert & Ostensibly Oriented x4/4; No gross deficits of Visual Acuity, Eye Movement, Jaw Opening, Facial Expression, Hearing, Phonation, or Head Movement;  No gross deficits of Tongue Movement or Slurring of Speech  Psychiatric:  Affect is appropriate; Language is present and fluent; Behavior is appropriate      Laboratory Studies:  CMP:   Lab Results   Component Value Date/Time     08/02/2022 05:40 PM    K 3.8 08/02/2022 05:40 PM     08/02/2022 05:40 PM    CO2 27 08/02/2022 05:40 PM    AGAP 8 08/02/2022 05:40 PM     (H) 08/02/2022 05:40 PM    BUN 16 08/02/2022 05:40 PM    CREA 1.00 08/02/2022 05:40 PM    GFRAA >60 08/02/2022 05:40 PM    GFRNA 56 (L) 08/02/2022 05:40 PM    CA 10.5 (H) 08/02/2022 05:40 PM    MG 1.8 08/02/2022 05:40 PM    ALB 4.1 08/02/2022 05:40 PM    TP 9.5 (H) 08/02/2022 05:40 PM    GLOB 5.4 (H) 08/02/2022 05:40 PM    AGRAT 0.8 08/02/2022 05:40 PM    ALT 33 08/02/2022 05:40 PM     CBC:   Lab Results   Component Value Date/Time    WBC 9.7 08/02/2022 05:40 PM    HGB 15.1 08/02/2022 05:40 PM    HCT 45.3 (H) 08/02/2022 05:40 PM    PLT  08/02/2022 05:40 PM     UNABLE TO REPORT ACCURATE COUNT DUE TO PLATELET AGGREGATION, HOWEVER, PLATELETS APPEAR NORMAL IN NUMBER ON SMEAR. PLEASE RESUBMIT SODIUM CITRATE (BLUE) AND EDTA (LAVENDER) TUBES FOR HEMATOLOGICAL TESTING. All Cardiac Markers in the last 24 hours: No results found for: CPK, CK, CKMMB, CKMB, RCK3, CKMBT, CKNDX, CKND1, ABIOLA, TROPT, TROIQ, RIGO, TROPT, TNIPOC, BNP, BNPP  Recent Glucose Results:   Lab Results   Component Value Date/Time     (H) 08/02/2022 05:40 PM     COAGS:   Lab Results   Component Value Date/Time    PTP 14.0 08/02/2022 05:40 PM    INR 1.0 08/02/2022 05:40 PM     Liver Panel:   Lab Results   Component Value Date/Time    ALB 4.1 08/02/2022 05:40 PM    TP 9.5 (H) 08/02/2022 05:40 PM    GLOB 5.4 (H) 08/02/2022 05:40 PM    AGRAT 0.8 08/02/2022 05:40 PM    ALT 33 08/02/2022 05:40 PM     (H) 08/02/2022 05:40 PM        Images Reviewed:  CT HEAD WO CONT    Result Date: 8/2/2022  CT OF THE HEAD WITHOUT CONTRAST. CLINICAL HISTORY: Headache.  TECHNIQUE: Helical scan obtained of the head were obtained from the skull vertex through the base of the skull without intravenous contrast.    All CT scans are performed using dose optimization techniques as appropriate to the performed exam including the following: Automated exposure control, adjustment of mA and/or kV according to patient size, and use of iterative reconstructive technique. COMPARISON: None. FINDINGS: The sulcal pattern and ventricular system are normal in size and configuration for age. Chronic appearing small lacunar infarcts in the globus pallidus bilaterally. No intracranial hemorrhage. No mass effect. The visualized paranasal sinuses are clear. The mastoid air cells are clear. The visualized bony structures are unremarkable. No acute intracranial findings. Small chronic appearing lacunar infarcts in the bilateral globus pallidus. CT ABD PELV WO CONT    Result Date: 8/2/2022  CT ABDOMEN AND PELVIS WITHOUT ENHANCEMENT INDICATION: Abdominal pain. Vomiting red blood. TECHNIQUE: Axial images obtained of the abdomen and pelvis without intravenous contrast. Coronal and sagittal reformatted images were obtained. All CT scans are performed using dose optimization techniques as appropriate to the performed exam including the following: Automated exposure control, adjustment of mA and/or kV according to patient size, and use of iterative reconstructive technique. COMPARISON: None. FINDINGS: Lung Base: Minimal atelectasis. Liver: Nodular hepatic contour with enlarged caudate lobe and lateral segment left lobe consistent with cirrhosis. Biliary: Cholelithiasis. Spleen: Splenomegaly. Pancreas: Unremarkable. Adrenal Glands: Unremarkable. Kidneys: Unremarkable. Gastrointestinal tract: Thickened appearance of the distal esophagus with slightly nodular external contour. This contour could be due to varices in the setting of cirrhosis, versus abutting lymph nodes or other cause. Diverticulosis coli. No dilated bowel. Normal appendix Bladder/ Pelvic Organs: Left adnexal 4 cm isoechoic nodule is likely an exophytic fibroid. Additional small partially calcified uterine fibroids. Peritoneum/ Retroperitoneum: Unremarkable.  Lymph Nodes: Lymphadenopathy versus clusters of varices in the gastrohepatic ligament region, limited without IV contrast. Vessels: No aortic aneurysm. Limited without contrast. Bones/Soft tissues: No acute findings. Lumbar spine degenerative changes. 1. No definite acute findings by noncontrast limited CT imaging. 2. Cirrhosis with features of portal hypertension including splenomegaly. Suspected varices along the esophagus but cannot differentiate from abutting lymph nodes or soft tissue wall thickening in the setting of a noncontrast exam. Esophagitis is not excluded. 3. Lymphadenopathy versus varices in the gastrohepatic ligament. 4. Left adnexal mass is most likely a fibroid. This can be confirmed with pelvic ultrasound on a nonemergent basis. Assessment:     Hospital Problems  Date Reviewed: 8/3/2022            Codes Class Noted POA    * (Principal) Hematemesis ICD-10-CM: K92.0  ICD-9-CM: 578.0  8/3/2022 Yes        Esophageal and gastric varices (HCC) ICD-10-CM: I85.00, I86.4  ICD-9-CM: 456.1, 456.8  8/3/2022 Yes        Epistaxis ICD-10-CM: R04.0  ICD-9-CM: 784.7  8/3/2022 Yes        Bipolar mood disorder ([facility] (Chronic) ICD-10-CM: F31.9  ICD-9-CM: 296.80  8/3/2022 Yes        Tobacco abuse (Chronic) ICD-10-CM: Z72.0  ICD-9-CM: 305.1  8/3/2022 Yes        Hepatitis C (Chronic) ICD-10-CM: B19.20  ICD-9-CM: 070.70  Unknown Yes        Alcohol use (Chronic) ICD-10-CM: Z72.89  ICD-9-CM: V49.89  8/10/2020 Yes        Heroin abuse (HCC) (Chronic) ICD-10-CM: F11.10  ICD-9-CM: 305.50  8/10/2020 Yes        [facility]osis [facility] liver ([facility] (Chronic) ICD-10-CM: K74.60  ICD-9-CM: 571.5  8/10/2020 Yes        PTSD (post-traumatic stress disorder) (Chronic) ICD-10-CM: F43.10  ICD-9-CM: 309.81  8/10/2020 Yes           Plan:     Hematemesis thought possibly 2°/2 Esophageal Variceal Bleed   Telemetry, Type & Screen, IV Octreotide, IV Pantoprazole 40 mg BID, IV Fluids (NS 50 mL/hr), PRN Antiemetics, PRN Pain Control, PRN Naloxone, H&H q8hrs, and Transfuse to Target Hgb >=7.0 g/dL.   Gastroenterological services consulted by [facility] ER Physician in [facility] ER. Alternatives, Risks, Benefits, and Consequences of Blood Transfusion were discussed with Patient in an effort to Obtain Informed Consent for Blood Transfusion. Alternatives, such as foregoing Blood Transfusion and waiting for the body to naturally replace hemoglobin and other connective tissues, were discussed (but not recommended). Risks of Blood Transfusion were discussed; such as Risk of Volume Overload in Congestive Heart Failure, Cirrhosis, or End Stage Renal Disease; Pre-Formed Antibodies causing Acute Lung Injury; transmission of blood borne infection from donated blood; \"Catastrophic Blood Transfusion Errors\" (i.e. acute hemolytic transfusion reactions) were discussed; and the risks of pain, bruising, bleeding, and infection at the site of IV Access. Patient was informed that there are no good substitutions for Blood available at this time to perform intravascular oxygen transportation (or the equivalent). Patient was given the opportunity to ask questions. Ultimately, Patient decided to Consent to Blood Transfusion. Blood Transfusion Consent resides in Patient's physical chart. Cirrhosis of Liver  Consider resuming Spironolactone and Furosemide when GI Bleed is under better control. History of Opioid Abuse  May have some difficulty controlling abdominal pain. Tobacco Abuse  PRN Nicotine Patch. DVT Prophylaxis  DVT mechanoprophylaxis is achieved with SCDs.     Signed By: Roseanna Darnell DO     August 3, 2022

## 2022-08-03 NOTE — PROGRESS NOTES
[email or web address]  [phone]    GASTROENTEROLOGY CONSULT      Impression:   1. Upper GI bleed - hematemesis starting 8/2/2022, none since arrival. Hx cirrhosis, HCV followed by Dr. Alka Hansen in 2020, last banding 8/2020 at [address] [place] [address]  2. Anemia - hgb 10.1, repeat labs pending  3. RUQ pain - associated with #1  4. 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 Elevated Alk phos - 252 8/2/2022  6. Bipolar  7. Polysubstance abuse - drug screen pos opiates      Plan:     1. EGD with possible banding today  2. Keep NPO  3. Continue PPI and octreotide  4. Monitor h/h, transfuse per protocol  5. No anticoagulation or NSAIDs  6. Monitor LFTs/MELD labs  7. Medical management per primary team      Chief Complaint: Hematemesis, abdominal pain      HPI:  Justyn Shaw is a 59 y.o. female who I am being asked to see in consultation for an opinion regarding the above. She presented to  ED 8/2/2022 complaining of multiple episodes of hematemesis starting earlier in the afternoon PTA. She complains of associated RUQ pain, denies NSAID use. She was recently hospitalized a few weeks ago for her bipolar disease and had some emesis at that time. She has a history of banding 8/2020 done at the [facility], has needed transfusions due to bleeding in the past. She has cirrhosis due to HCV, unclear if she has been treated. She denies change in bowel habits, melena or BRBPR.         PMH:   Past Medical History:   Diagnosis Date    Alcohol use     Bipolar 2 disorder ([facility]     Hepatitis C     Heroin abuse ([facility]     PTSD (post-traumatic stress disorder)        PSH:   Past Surgical History:   Procedure Laterality Date    HX OTHER SURGICAL  08/2020    EGD with variceal banding       Social HX:   Social History     Socioeconomic History    Marital status: SINGLE     Spouse name: Not on file    Number of children: Not on file    Years of education: Not on file    Highest education level: Not on file   Occupational History    Not on file   Tobacco Use    Smoking status: Every Day     Packs/day: 0.50     Years: 15.00     Pack years: 7.50     Types: Cigarettes    Smokeless tobacco: Never   Substance and Sexual Activity    Alcohol use:  Yes     Alcohol/week: 1.0 standard drink     Types: 1 Cans of beer per week    Drug use: Yes     Frequency: 4.0 times per week     Types: Heroin     Comment: inhaled    Sexual activity: Not on file   Other Topics Concern     Service Not Asked    Blood Transfusions Not Asked    Caffeine Concern Not Asked    Occupational Exposure Not Asked    Hobby Hazards Not Asked    Sleep Concern Not Asked    Stress Concern Not Asked    Weight Concern Not Asked    Special Diet Not Asked    Back Care Not Asked    Exercise Not Asked    Bike Helmet Not Asked    Seat Belt Not Asked    Self-Exams Not Asked   Social History Narrative    Not on file     Social Determinants of Health     Financial Resource Strain: Not on file   Food Insecurity: Not on file   Transportation Needs: Not on file   Physical Activity: Not on file   Stress: Not on file   Social Connections: Not on file   Intimate Partner Violence: Not on file   Housing Stability: Not on file       FHX:   Family History   Problem Relation Age of Onset    Heart Disease Mother     Cancer Father        Allergy:   No Known Allergies    Patient Active Problem List   Diagnosis Code    Liver failure ([facility] K72.90    Anemia D64.9    Thrombocytopenia (HCC) D69.6    Esophageal varices (HCC) I85.00    Hepatitis C B19.20    Alcohol use Z72.89    Heroin abuse (HCC) F11.10    Cirrhosis of liver ([facility] K74.60    PTSD (post-traumatic stress disorder) F43.10    Hypokalemia E87.6    Hematemesis K92.0    Esophageal and gastric varices (HCC) I85.00, I86.4    Epistaxis R04.0    Bipolar mood disorder (HCC) F31.9    Tobacco abuse Z72.0    Cirrhosis ([facility] K74.60       Home Medications:     Medications Prior to Admission   Medication Sig    furosemide (LASIX) 40 mg tablet Take 2 Tabs by mouth daily. spironolactone (ALDACTONE) 100 mg tablet Take 2 Tabs by mouth daily. naloxone (Narcan) 4 mg/actuation nasal spray Use 1 spray intranasally, then discard. Repeat with new spray every 2 min as needed for opioid overdose symptoms, alternating nostrils. Review of Systems:     Constitutional: No fevers, chills, weight loss, fatigue. Skin: No rashes, pruritis, jaundice, ulcerations, erythema. HENT: No headaches, nosebleeds, sinus pressure, rhinorrhea, sore throat. Eyes: No visual changes, blurred vision, eye pain, photophobia, jaundice. Cardiovascular: No chest pain, heart palpitations. Respiratory: No cough, SOB, wheezing, chest discomfort, orthopnea. Gastrointestinal: Neg unless noted otherwise in H&P   Genitourinary: No dysuria, bleeding, discharge, pyuria. Musculoskeletal: No weakness, arthralgias, wasting. Endo: No sweats. Heme: No bruising, easy bleeding. Allergies: As noted. Neurological: Cranial nerves intact. Alert and oriented. Gait not assessed. Psychiatric:  No anxiety, depression, hallucinations. Visit Vitals  BP (!) 158/70 (BP 1 Location: Left upper arm, BP Patient Position: At rest) Comment: Reported to RN Amy De La Torre   Pulse 75   Temp 97.8 °F (36.6 °C)   Resp 20   Wt 76.5 kg (168 lb 9.6 oz)   SpO2 98%   Breastfeeding No   BMI 25.64 kg/m²       Physical Assessment:     constitutional: appearance: well developed, well nourished, normal habitus, no deformities, in no acute distress. skin: inspection: no rashes, ulcers, icterus or other lesions; no clubbing or telangiectasias. palpation: no induration or subcutaneos nodules. eyes: inspection: normal conjunctivae and lids; no jaundice pupils: normal  ENMT: mouth: normal oral mucosa,lips and gums; good dentition. oropharynx: normal tongue, hard and soft palate; posterior pharynx without erithema, exudate or lesions. neck: thyroid: normal size, consistency and position; no masses or tenderness. respiratory: effort: normal chest excursion; no intercostal retraction or accessory muscle use. cardiovascular: abdominal aorta: normal size and position; no bruits. palpation: PMI of normal size and position; normal rhythm; no thrill or murmurs. abdominal: abdomen: normal consistency; RUQ and epigastric tenderness no masses. hernias: no hernias appreciated. liver: normal size and consistency. spleen: not palpable. rectal: hemoccult/guaiac: not performed. musculoskeletal: digits and nails: no clubbing, cyanosis, petechiae or other inflammatory conditions. head and neck: normal range of motion; no pain, crepitation or contracture. spine/ribs/pelvis: normal range of motion; no pain, deformity or contracture. neurologic: cranial nerves: II-XII normal. Pupils intact. psychiatric: judgement/insight: within normal limits. memory: within normal limits for recent and remote events. mood and affect: no evidence of depression, anxiety or agitation. orientation: oriented to time, space and person. Basic Metabolic Profile   Recent Labs     08/02/22  1740      K 3.8      CO2 27   BUN 16   *   CA 10.5*   MG 1.8         CBC w/Diff    Recent Labs     08/03/22  0430 08/02/22  1740   WBC  --  9.7   RBC  --  5.46*   HGB 10.1* 15.1   HCT 30.0* 45.3*   MCV  --  83.0   MCH  --  27.7   MCHC  --  33.3   RDW  --  14.6*   PLT  --  UNABLE TO REPORT ACCURATE COUNT DUE TO PLATELET AGGREGATION, HOWEVER, PLATELETS APPEAR NORMAL IN NUMBER ON SMEAR. PLEASE RESUBMIT SODIUM CITRATE (BLUE) AND EDTA (LAVENDER) TUBES FOR HEMATOLOGICAL TESTING. Recent Labs     08/02/22  1740   GRANS 74*   LYMPH 19*   EOS 1        Hepatic Function   Recent Labs     08/02/22  1740   ALB 4.1   TP 9.5*   TBILI 0.9   *   LPSE 357        Coags   Recent Labs     08/02/22  1740   PTP 14.0   INR 1.0           KATHE Kaufman.    [facility], [facility]  [phone]    [email or web address]

## 2022-08-03 NOTE — ANESTHESIA POSTPROCEDURE EVALUATION
Procedure(s):  ENDOSCOPY with banding. general    Anesthesia Post Evaluation      Multimodal analgesia: multimodal analgesia used between 6 hours prior to anesthesia start to PACU discharge  Patient location during evaluation: PACU  Patient participation: complete - patient participated  Level of consciousness: awake  Pain score: 3  Airway patency: patent  Anesthetic complications: no  Cardiovascular status: acceptable  Respiratory status: acceptable  Hydration status: acceptable  Post anesthesia nausea and vomiting:  none  Final Post Anesthesia Temperature Assessment:  Normothermia (36.0-37.5 degrees C)      INITIAL Post-op Vital signs:   Vitals Value Taken Time   /71 08/03/22 1129   Temp 36.8 °C (98.2 °F) 08/03/22 1129   Pulse 71 08/03/22 1138   Resp 17 08/03/22 1138   SpO2 100 % 08/03/22 1138   Vitals shown include unvalidated device data.

## 2022-08-03 NOTE — ROUTINE PROCESS
Bedside and Verbal shift change report given to [address] (oncoming nurse) by Vesta Gaucher (offgoing nurse).  Report included the following information SBAR, Kardex, MAR, Recent Results, and Cardiac Rhythm SR .

## 2022-08-04 LAB
ALBUMIN SERPL-MCNC: 2.9 G/DL (ref 3.4–5)
ALBUMIN/GLOB SERPL: 0.7 {RATIO} (ref 0.8–1.7)
ALP SERPL-CCNC: 122 U/L (ref 45–117)
ALT SERPL-CCNC: 24 U/L (ref 13–56)
ANION GAP SERPL CALC-SCNC: 7 MMOL/L (ref 3–18)
AST SERPL-CCNC: 23 U/L (ref 10–38)
BASOPHILS # BLD: 0 K/UL (ref 0–0.1)
BASOPHILS NFR BLD: 0 % (ref 0–2)
BILIRUB SERPL-MCNC: 0.8 MG/DL (ref 0.2–1)
BUN SERPL-MCNC: 10 MG/DL (ref 7–18)
BUN/CREAT SERPL: 11 (ref 12–20)
CALCIUM SERPL-MCNC: 8.5 MG/DL (ref 8.5–10.1)
CHLORIDE SERPL-SCNC: 112 MMOL/L (ref 100–111)
CO2 SERPL-SCNC: 25 MMOL/L (ref 21–32)
CREAT SERPL-MCNC: 0.89 MG/DL (ref 0.6–1.3)
DIFFERENTIAL METHOD BLD: ABNORMAL
EOSINOPHIL # BLD: 0.1 K/UL (ref 0–0.4)
EOSINOPHIL NFR BLD: 4 % (ref 0–5)
ERYTHROCYTE [DISTWIDTH] IN BLOOD BY AUTOMATED COUNT: 14.2 % (ref 11.6–14.5)
GLOBULIN SER CALC-MCNC: 4.2 G/DL (ref 2–4)
GLUCOSE SERPL-MCNC: 165 MG/DL (ref 74–99)
HCT VFR BLD AUTO: 30.7 % (ref 35–45)
HGB BLD-MCNC: 10.2 G/DL (ref 12–16)
IMM GRANULOCYTES # BLD AUTO: 0 K/UL (ref 0–0.04)
IMM GRANULOCYTES NFR BLD AUTO: 0 % (ref 0–0.5)
LYMPHOCYTES # BLD: 0.8 K/UL (ref 0.9–3.6)
LYMPHOCYTES NFR BLD: 28 % (ref 21–52)
MCH RBC QN AUTO: 28.1 PG (ref 24–34)
MCHC RBC AUTO-ENTMCNC: 33.2 G/DL (ref 31–37)
MCV RBC AUTO: 84.6 FL (ref 78–100)
MONOCYTES # BLD: 0.2 K/UL (ref 0.05–1.2)
MONOCYTES NFR BLD: 8 % (ref 3–10)
NEUTS SEG # BLD: 1.6 K/UL (ref 1.8–8)
NEUTS SEG NFR BLD: 60 % (ref 40–73)
NRBC # BLD: 0 K/UL (ref 0–0.01)
NRBC BLD-RTO: 0 PER 100 WBC
PLATELET # BLD AUTO: ABNORMAL K/UL (ref 135–420)
PLATELET COMMENTS,PCOM: ABNORMAL
PMV BLD AUTO: 13 FL (ref 9.2–11.8)
POTASSIUM SERPL-SCNC: 3.8 MMOL/L (ref 3.5–5.5)
PROT SERPL-MCNC: 7.1 G/DL (ref 6.4–8.2)
RBC # BLD AUTO: 3.63 M/UL (ref 4.2–5.3)
RBC MORPH BLD: ABNORMAL
SODIUM SERPL-SCNC: 144 MMOL/L (ref 136–145)
WBC # BLD AUTO: 2.7 K/UL (ref 4.6–13.2)

## 2022-08-04 PROCEDURE — C9113 INJ PANTOPRAZOLE SODIUM, VIA: HCPCS | Performed by: INTERNAL MEDICINE

## 2022-08-04 PROCEDURE — 74011250636 HC RX REV CODE- 250/636: Performed by: INTERNAL MEDICINE

## 2022-08-04 PROCEDURE — 74011000250 HC RX REV CODE- 250: Performed by: INTERNAL MEDICINE

## 2022-08-04 PROCEDURE — 85025 COMPLETE CBC W/AUTO DIFF WBC: CPT

## 2022-08-04 PROCEDURE — 2709999900 HC NON-CHARGEABLE SUPPLY

## 2022-08-04 PROCEDURE — 36415 COLL VENOUS BLD VENIPUNCTURE: CPT

## 2022-08-04 PROCEDURE — 99232 SBSQ HOSP IP/OBS MODERATE 35: CPT | Performed by: HOSPITALIST

## 2022-08-04 PROCEDURE — 65270000046 HC RM TELEMETRY

## 2022-08-04 PROCEDURE — 80053 COMPREHEN METABOLIC PANEL: CPT

## 2022-08-04 PROCEDURE — 74011250636 HC RX REV CODE- 250/636: Performed by: STUDENT IN AN ORGANIZED HEALTH CARE EDUCATION/TRAINING PROGRAM

## 2022-08-04 PROCEDURE — 74011636637 HC RX REV CODE- 636/637: Performed by: STUDENT IN AN ORGANIZED HEALTH CARE EDUCATION/TRAINING PROGRAM

## 2022-08-04 RX ORDER — CALCIUM CARB/MAGNESIUM CARB 311-232MG
5 TABLET ORAL
Status: DISCONTINUED | OUTPATIENT
Start: 2022-08-04 | End: 2022-08-05 | Stop reason: HOSPADM

## 2022-08-04 RX ADMIN — MORPHINE SULFATE 2 MG: 2 INJECTION, SOLUTION INTRAMUSCULAR; INTRAVENOUS at 13:57

## 2022-08-04 RX ADMIN — SODIUM CHLORIDE, PRESERVATIVE FREE 10 ML: 5 INJECTION INTRAVENOUS at 21:45

## 2022-08-04 RX ADMIN — DEXTROSE MONOHYDRATE 50 ML/HR: 5 INJECTION, SOLUTION INTRAVENOUS at 09:53

## 2022-08-04 RX ADMIN — SODIUM CHLORIDE, PRESERVATIVE FREE 10 ML: 5 INJECTION INTRAVENOUS at 06:02

## 2022-08-04 RX ADMIN — OCTREOTIDE ACETATE 50 MCG/HR: 500 INJECTION, SOLUTION INTRAVENOUS; SUBCUTANEOUS at 23:01

## 2022-08-04 RX ADMIN — PANTOPRAZOLE SODIUM 40 MG: 40 INJECTION, POWDER, FOR SOLUTION INTRAVENOUS at 09:54

## 2022-08-04 RX ADMIN — WATER 2 G: 1 INJECTION INTRAMUSCULAR; INTRAVENOUS; SUBCUTANEOUS at 17:30

## 2022-08-04 RX ADMIN — MORPHINE SULFATE 2 MG: 2 INJECTION, SOLUTION INTRAMUSCULAR; INTRAVENOUS at 21:45

## 2022-08-04 RX ADMIN — OCTREOTIDE ACETATE 50 MCG/HR: 500 INJECTION, SOLUTION INTRAVENOUS; SUBCUTANEOUS at 09:49

## 2022-08-04 RX ADMIN — PANTOPRAZOLE SODIUM 40 MG: 40 INJECTION, POWDER, FOR SOLUTION INTRAVENOUS at 21:45

## 2022-08-04 RX ADMIN — SODIUM CHLORIDE, PRESERVATIVE FREE 10 ML: 5 INJECTION INTRAVENOUS at 14:01

## 2022-08-04 NOTE — ROUTINE PROCESS
Bedside and Verbal shift change report given to [address] [place] [address] (oncoming nurse) by Sue Ponce (offgoing nurse). Report included the following information SBAR, Kardex, MAR, Recent Results, and Cardiac Rhythm SR . Patient awake on rounds, call light in reach.

## 2022-08-04 NOTE — PROGRESS NOTES
[email or web address]  [phone]    Gastroenterology follow up-Progress note    Impression:  1. Hematemesis s/p EGD with banding 8/3/2022 - two columns of large varices, one with red ana sign, where she probably bled. Banding performed with appropriate flattening of both varices. Moderate to severe PHG, no gastric varices. 2. Anemia - hgb 10.4, stable  3. RUQ pain - associated with #1  4. 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 Elevated Alk phos - 122 8/4/2022  6. Bipolar  7. Polysubstance abuse - drug screen pos opiates    Plan:  1. Continue BID PPI and carafate  2. Advance diet as tolerated  3. Stop octreotide when 72 hours has been completed  4. Will need repeat EGD in 4 to 6 weeks to confirm eradication of varices. This can be with [facility] or her hepatologists at [address] [place] [address] (Dr. Baltazar Mora or Dr. Cha Burton)  5. Needs close OP follow up with her hepatologists, will need to consider transplant. 6. Medical management per primary team    Chief Complaint: hematemesis, abdominal pain      Subjective:  Doing well this morning, some minor sore throat pain, denies abdominal pain, no further bleeding. She is anxious to get back to [place]. ROS: Denies any fevers, chills, rash.      Eyes: conjunctiva normal, EOM normal   Neck: ROM normal, supple and trachea normal   Cardiovascular: heart normal, intact distal pulses, normal rate and regular rhythm   Pulmonary/Chest Wall: breath sounds normal and effort normal   Abdominal: appearance normal, bowel sounds normal and soft, non-acute, non-tender     Patient Active Problem List   Diagnosis Code    Liver failure (HCC) K72.90    Anemia D64.9    Thrombocytopenia (HCC) D69.6    Esophageal varices (HCC) I85.00    Hepatitis C B19.20    Alcohol use Z72.89    Heroin abuse (HCC) F11.10    Cirrhosis of liver (HCC) K74.60    PTSD (post-traumatic stress disorder) F43.10    Hypokalemia E87.6    Hematemesis K92.0    Esophageal and gastric varices ([facility] I85.00, I86.4    Epistaxis R04.0    Bipolar mood disorder ([facility] F31.9    Tobacco abuse Z72.0    Cirrhosis ([facility] K74.60         Visit Vitals  BP (!) 163/89 (BP 1 Location: Left upper arm, BP Patient Position: At rest) Comment: Reported to RN Hayley   Pulse 70   Temp 98 °F (36.7 °C)   Resp 16   Ht 5' 10\" (1.778 m)   Wt 76.5 kg (168 lb 9.6 oz)   SpO2 100%   Breastfeeding No   BMI 24.19 kg/m²           Intake/Output Summary (Last 24 hours) at 8/4/2022 1100  Last data filed at 8/4/2022 0408  Gross per 24 hour   Intake 2110 ml   Output 1800 ml   Net 310 ml       CBC w/Diff    Lab Results   Component Value Date/Time    WBC 2.7 (L) 08/04/2022 12:32 AM    RBC 3.63 (L) 08/04/2022 12:32 AM    HGB 10.2 (L) 08/04/2022 12:32 AM    HCT 30.7 (L) 08/04/2022 12:32 AM    MCV 84.6 08/04/2022 12:32 AM    MCH 28.1 08/04/2022 12:32 AM    MCHC 33.2 08/04/2022 12:32 AM    RDW 14.2 08/04/2022 12:32 AM    PLT  08/04/2022 12:32 AM     UNABLE TO REPORT ACCURATE COUNT DUE TO PLATELET AGGREGATION, HOWEVER, PLATELETS APPEAR DECREASED IN NUMBER ON SMEAR. PLEASE RESUBMIT SODIUM CITRATE (BLUE) AND EDTA (LAVENDAR) TUBES FOR HEMATOLOGICAL TESTING. Lab Results   Component Value Date/Time    GRANS 60 08/04/2022 12:32 AM    LYMPH 28 08/04/2022 12:32 AM    EOS 4 08/04/2022 12:32 AM    BASOS 0 08/04/2022 12:32 AM      Basic Metabolic Profile   Recent Labs     08/04/22  0032 08/03/22  1450 08/02/22  1740      < > 142   K 3.8   < > 3.8   *   < > 107   CO2 25   < > 27   BUN 10   < > 16   CA 8.5   < > 10.5*   MG  --   --  1.8    < > = values in this interval not displayed.         Hepatic Function    Lab Results   Component Value Date/Time    ALB 2.9 (L) 08/04/2022 12:32 AM    TP 7.1 08/04/2022 12:32 AM     (H) 08/04/2022 12:32 AM    No results found for: TBIL       Coags   Recent Labs     08/02/22  1740   PTP 14.0   INR 1.0               Damien KATHE Peguero    [facility]. [email or web address]  Phone: [phone]  Pager: [phone]

## 2022-08-04 NOTE — PROGRESS NOTES
Patient was placed at [facility] by [place]. Patient states they will not take her back now due to medical ? Patient cannot return to where she was living as owner had sold house . She states needs clothes back from [facility] and will go to [facility] in [place] when she gets discharged if [employer] take her. CM will contact them . Patient signed [facility] transfer form. CM faxed with clinicals and request for [facility] to get patient placed in another sober living facility to prevent relapse   Reason for Admission:  Esophageal and gastric varices ([facility] [I85.00, I86.4]  Hematemesis [K92.0]  Cirrhosis ([facility] [K74.60]                 RUR Score:    12            Plan for utilizing home health:    n/a                      Likelihood of Readmission:   LOW                         Transition of Care Plan:              Initial assessment completed with patient. Cognitive status of patient: oriented to time, place, person and situation. Face sheet information confirmed:  yes. Has no address. CM updated phone number The patient designates sister  to participate in her discharge plan and to receive any needed information. This patient lives in a homeless  . Patient is able to navigate steps as needed. Prior to hospitalization, patient was considered to be independent with ADLs/IADLS : yes . Patient has a current ACP document on file: no      Healthcare Decision Maker:     Click here to complete [address] including selection of the Healthcare Decision Maker Relationship (ie \"Primary\")    The other:  cab will be available to transport patient home upon discharge. The patient already has none reported, and  medical equipment available in the home. Patient is not currently active with home health. Patient has not stayed in a skilled nursing facility or rehab. Was  stay within last 60 days : no. This patient is on dialysis :no    Currently, the discharge plan is tbd.     The patient states that she can obtain her medications from the pharmacy, and take her medications as directed. Patient's current insurance is Medicare A and [facility]        Care Management Interventions  PCP Verified by CM: Yes  Mode of Transport at Discharge:  Other (see comment) (cab)  Transition of Care Consult (CM Consult): Discharge Planning  Support Systems: Other Family Member(s)  Confirm Follow Up Transport: Other (see comment)  The Plan for Transition of Care is Related to the Following Treatment Goals : friend house  Discharge Location  Patient Expects to be Discharged to[de-identified] Other:        Samir Boyer RN BSN  /Discharge Planner

## 2022-08-04 NOTE — PROGRESS NOTES
conducted an initial consultation and Spiritual Assessment for Milda Buerger, who is a 59 y.o.,female. Patients Primary Language is: [place]. According to the patients EMR Bahai Affiliation is: [facility].     The reason the Patient came to the hospital is:   Patient Active Problem List    Diagnosis Date Noted    Hematemesis 08/03/2022    Esophageal and gastric varices ([facility] 08/03/2022    Epistaxis 08/03/2022    Bipolar mood disorder ([facility] 08/03/2022    Tobacco abuse 08/03/2022    Cirrhosis ([facility] 08/03/2022    Anemia 08/10/2020    Thrombocytopenia ([facility] 08/10/2020    Esophageal varices ([facility] 08/10/2020    Alcohol use 08/10/2020    Heroin abuse ([facility] 08/10/2020    Cirrhosis of liver ([facility] 08/10/2020    PTSD (post-traumatic stress disorder) 08/10/2020    Hypokalemia 08/10/2020    Hepatitis C     Liver failure ([facility] 08/09/2020        The  provided the following Interventions:  Initiated a relationship of care and support. Provided information about Spiritual Care Services. Chart reviewed. The following outcomes where achieved:  Patient expressed gratitude for 's visit. Assessment:  Patient does not have any Taoist/cultural needs that will affect patients preferences in health care. Plan:  Chaplains will continue to follow and will provide pastoral care on an as needed/requested basis.  recommends bedside caregivers page  on duty if patient shows signs of acute spiritual or emotional distress.     [address]  ([phone])

## 2022-08-05 VITALS
WEIGHT: 168.6 LBS | BODY MASS INDEX: 24.14 KG/M2 | HEIGHT: 70 IN | DIASTOLIC BLOOD PRESSURE: 95 MMHG | TEMPERATURE: 97.8 F | SYSTOLIC BLOOD PRESSURE: 180 MMHG | OXYGEN SATURATION: 100 % | RESPIRATION RATE: 16 BRPM | HEART RATE: 67 BPM

## 2022-08-05 PROCEDURE — 99239 HOSP IP/OBS DSCHRG MGMT >30: CPT | Performed by: HOSPITALIST

## 2022-08-05 PROCEDURE — 74011250636 HC RX REV CODE- 250/636: Performed by: INTERNAL MEDICINE

## 2022-08-05 PROCEDURE — C9113 INJ PANTOPRAZOLE SODIUM, VIA: HCPCS | Performed by: INTERNAL MEDICINE

## 2022-08-05 PROCEDURE — 74011000250 HC RX REV CODE- 250: Performed by: INTERNAL MEDICINE

## 2022-08-05 RX ORDER — FUROSEMIDE 20 MG/1
TABLET ORAL
Qty: 30 TABLET | Refills: 0 | Status: SHIPPED | OUTPATIENT
Start: 2022-08-05

## 2022-08-05 RX ORDER — SPIRONOLACTONE 100 MG/1
100 TABLET, FILM COATED ORAL DAILY
Qty: 30 TABLET | Refills: 0 | Status: SHIPPED | OUTPATIENT
Start: 2022-08-05

## 2022-08-05 RX ADMIN — SODIUM CHLORIDE, PRESERVATIVE FREE 10 ML: 5 INJECTION INTRAVENOUS at 13:27

## 2022-08-05 RX ADMIN — PANTOPRAZOLE SODIUM 40 MG: 40 INJECTION, POWDER, FOR SOLUTION INTRAVENOUS at 08:04

## 2022-08-05 RX ADMIN — SODIUM CHLORIDE, PRESERVATIVE FREE 10 ML: 5 INJECTION INTRAVENOUS at 06:50

## 2022-08-05 NOTE — DISCHARGE INSTRUCTIONS
DISCHARGE SUMMARY from Nurse    PATIENT INSTRUCTIONS:    After general anesthesia or intravenous sedation, for 24 hours or while taking prescription Narcotics:  Limit your activities  Do not drive and operate hazardous machinery  Do not make important personal or business decisions  Do  not drink alcoholic beverages  If you have not urinated within 8 hours after discharge, please contact your surgeon on call. Report the following to your surgeon:  Excessive pain, swelling, redness or odor of or around the surgical area  Temperature over 100.5  Nausea and vomiting lasting longer than 4 hours or if unable to take medications  Any signs of decreased circulation or nerve impairment to extremity: change in color, persistent  numbness, tingling, coldness or increase pain  Any questions    What to do at Home:  Recommended activity: Activity as tolerated    If you experience any of the following symptoms abdominal pain, bleeding, dizziness, fatigue , please follow up with your PCP. *  Please give a list of your current medications to your Primary Care Provider. *  Please update this list whenever your medications are discontinued, doses are      changed, or new medications (including over-the-counter products) are added. *  Please carry medication information at all times in case of emergency situations. These are general instructions for a healthy lifestyle:    No smoking/ No tobacco products/ Avoid exposure to second hand smoke  Surgeon General's Warning:  Quitting smoking now greatly reduces serious risk to your health.     Obesity, smoking, and sedentary lifestyle greatly increases your risk for illness    A healthy diet, regular physical exercise & weight monitoring are important for maintaining a healthy lifestyle    You may be retaining fluid if you have a history of heart failure or if you experience any of the following symptoms:  Weight gain of 3 pounds or more overnight or 5 pounds in a week, increased swelling in our hands or feet or shortness of breath while lying flat in bed. Please call your doctor as soon as you notice any of these symptoms; do not wait until your next office visit. The discharge information has been reviewed with the patient. The patient verbalized understanding. Discharge medications reviewed with the patient and appropriate educational materials and side effects teaching were provided.   ___________________________________________________________________________________________________________________________________

## 2022-08-05 NOTE — PROGRESS NOTES
[facility]    Patient: Ashok Woods Age: 59 y.o. : 1960 MR#: [id number] SSN: [id number]  Date/Time: 2022     Subjective:     Patient seen and evaluated, lying in bed, no acute distress. Patient has been started on a regular diet which she has tolerated. No further episodes of hematemesis. 68-year-old female presents to the emergency room secondary to hematemesis. Patient reports 6 episodes of hematemesis that were randy blood. Patient has a known history of alcoholic cirrhosis with esophageal varices and has received blood transfusions in the past secondary to previous GI bleed. Patient is a chronic smoker. Patient seen by GI, underwent EGD which showed moderate to severe PHG. 2 columns of large varices, one with red ana sign, 2 bands were placed. Continue liquid diet. Carafate slurry 4 times daily, can wean octreotide in 72 hours. Repeat EGD in 4 to 6 weeks. -H&H has remained stable, patient started on a regular cardiac diet. Patient tolerated diet well with no further episodes of hematemesis. Continue to monitor, if stable anticipate discharge home in a.m. Assessment/Plan:     Upper GI bleed secondary to esophageal varices given underlying history of cirrhosis-patient underwent EGD which showed moderate to severe PHG -2 columns of large varices 1 with red ana sign, 2 bands were placed. Patient tolerated procedure well. Patient started on diet today which she has tolerated. Continue Carafate slurry 4 times daily as well as PPI. GI on board. Continue Rocephin, will change to p.o. antibiotics at discharge. Anemia secondary to acute blood loss -continue to monitor H&H, transfuse for hemoglobin less than 7. History of cirrhosis secondary to alcohol abuse  Sequelae of liver disease secondary to cirrhosis, thrombocytopenia-continue to monitor platelet count.   DVT prophylaxis-SCDs  Full code    Anticipate discharge home in [facility]. Laila Mason MD  22      Case discussed with:  [x]Patient  []Family  []Nursing  []Case Management  DVT Prophylaxis:  []Lovenox  []Hep SQ  [x]SCDs  []Coumadin   []On Heparin gtt    Objective:   VS: Visit Vitals  BP (!) 166/90 (BP 1 Location: Left upper arm, BP Patient Position: At rest)   Pulse 72   Temp 99.3 °F (37.4 °C)   Resp 16   Ht 5' 10\" (1.778 m)   Wt 76.5 kg (168 lb 9.6 oz)   SpO2 99%   Breastfeeding No   BMI 24.19 kg/m²      Tmax/24hrs: Temp (24hrs), Av.3 °F (36.8 °C), Min:97.9 °F (36.6 °C), Max:99.3 °F (37.4 °C)  IOBRIEF  Intake/Output Summary (Last 24 hours) at 2022 2133  Last data filed at 2022 1747  Gross per 24 hour   Intake 1560 ml   Output 3000 ml   Net -1440 ml       General:  Alert, cooperative, no acute distress    Pulmonary:  CTA Bilaterally. No Wheezing/Rhonchi/Rales. Cardiovascular: Regular rate and Rhythm. GI:  Soft, Non distended, Non tender. + Bowel sounds. Extremities:  No edema, cyanosis, clubbing. No calf tenderness. Neurologic: Alert and oriented X 3. No acute neuro deficits.   Additional:    Medications:   Current Facility-Administered Medications   Medication Dose Route Frequency    melatonin (rapid dissolve) tablet 5 mg  5 mg Oral QHS PRN    sodium chloride (NS) flush 5-40 mL  5-40 mL IntraVENous Q8H    sodium chloride (NS) flush 5-40 mL  5-40 mL IntraVENous PRN    acetaminophen (TYLENOL) tablet 650 mg  650 mg Oral Q6H PRN    Or    acetaminophen (TYLENOL) suppository 650 mg  650 mg Rectal Q6H PRN    polyethylene glycol (MIRALAX) packet 17 g  17 g Oral DAILY PRN    ondansetron (ZOFRAN ODT) tablet 4 mg  4 mg Oral Q8H PRN    Or    ondansetron (ZOFRAN) injection 4 mg  4 mg IntraVENous Q6H PRN    albuterol-ipratropium (DUO-NEB) 2.5 MG-0.5 MG/3 ML  3 mL Nebulization Q6H PRN    nitroglycerin (NITROBID) 2 % ointment 0.5 Inch  0.5 Inch Topical Q6H PRN    pantoprazole (PROTONIX) injection 40 mg  40 mg IntraVENous Q12H    cefTRIAXone (ROCEPHIN) 2 g in sterile water (preservative free) 20 mL IV syringe  2 g IntraVENous Q24H    nicotine (NICODERM CQ) 14 mg/24 hr patch 1 Patch  1 Patch TransDERmal DAILY PRN    morphine injection 2-4 mg  2-4 mg IntraVENous Q3H PRN    naloxone (NARCAN) injection 0.4 mg  0.4 mg IntraVENous EVERY 2 MINUTES AS NEEDED    octreotide (SANDOSTATIN) 500 mcg in 0.9% sodium chloride 500 mL infusion  50 mcg/hr IntraVENous CONTINUOUS       Imaging:   XR Results (most recent):  No results found for this or any previous visit. CT Results (most recent):  Results from Hospital Encounter encounter on 08/02/22    CT ABD PELV WO CONT    Narrative  CT ABDOMEN AND PELVIS WITHOUT ENHANCEMENT    INDICATION: Abdominal pain. Vomiting red blood. TECHNIQUE: Axial images obtained of the abdomen and pelvis without intravenous  contrast. Coronal and sagittal reformatted images were obtained. All CT scans  are performed using dose optimization techniques as appropriate to the performed  exam including the following: Automated exposure control, adjustment of mA  and/or kV according to patient size, and use of iterative reconstructive  technique. COMPARISON: None. FINDINGS:  Lung Base: Minimal atelectasis. Liver: Nodular hepatic contour with enlarged caudate lobe and lateral segment  left lobe consistent with cirrhosis. Biliary: Cholelithiasis. Spleen: Splenomegaly. Pancreas: Unremarkable. Adrenal Glands: Unremarkable. Kidneys: Unremarkable. Gastrointestinal tract: Thickened appearance of the distal esophagus with  slightly nodular external contour. This contour could be due to varices in the  setting of cirrhosis, versus abutting lymph nodes or other cause. Diverticulosis  coli. No dilated bowel. Normal appendix    Bladder/ Pelvic Organs: Left adnexal 4 cm isoechoic nodule is likely an  exophytic fibroid. Additional small partially calcified uterine fibroids. Peritoneum/ Retroperitoneum: Unremarkable.   Lymph Nodes: Lymphadenopathy versus clusters of varices in the gastrohepatic  ligament region, limited without IV contrast.  Vessels: No aortic aneurysm. Limited without contrast.    Bones/Soft tissues: No acute findings. Lumbar spine degenerative changes. Impression  1. No definite acute findings by noncontrast limited CT imaging. 2. Cirrhosis with features of portal hypertension including splenomegaly. Suspected varices along the esophagus but cannot differentiate from abutting  lymph nodes or soft tissue wall thickening in the setting of a noncontrast exam.  Esophagitis is not excluded. 3. Lymphadenopathy versus varices in the gastrohepatic ligament. 4. Left adnexal mass is most likely a fibroid. This can be confirmed with pelvic  ultrasound on a nonemergent basis. 08/09/20    ECHO ADULT COMPLETE 08/10/2020 8/10/2020    Interpretation Summary  · LV: Normal cavity size and systolic function (ejection fraction normal). Increased wall thickness. Estimated left ventricular ejection fraction is 60 - 65%. Mild (grade 1) left ventricular diastolic dysfunction E'E= 13.18.  · AV: Aortic valve sclerosis. Mild aortic valve regurgitation is present. · MV: Mitral valve non-specific thickening. Trace mitral valve regurgitation is present. · TV: Mild tricuspid valve regurgitation is present. · PA: Pulmonary arterial systolic pressure is 28 mmHg. Signed by: Tatiana Pepe MD on 8/10/2020  1:28 PM       MRI Results (most recent):  No results found for this or any previous visit.         Labs:    Recent Results (from the past 48 hour(s))   URINALYSIS W/ RFLX MICROSCOPIC    Collection Time: 08/02/22 11:20 PM   Result Value Ref Range    Color YELLOW      Appearance CLEAR      Specific gravity 1.026 1.005 - 1.030      pH (UA) 5.5 5.0 - 8.0      Protein TRACE (A) NEG mg/dL    Glucose Negative NEG mg/dL    Ketone TRACE (A) NEG mg/dL    Bilirubin Negative NEG      Blood Negative NEG      Urobilinogen 1.0 0.2 - 1.0 EU/dL    Nitrites Negative NEG      Leukocyte Esterase Negative NEG     URINE MICROSCOPIC ONLY    Collection Time: 08/02/22 11:20 PM   Result Value Ref Range    WBC 0 to 2 0 - 4 /hpf    RBC 0 to 1 0 - 5 /hpf    Epithelial cells 2+ 0 - 5 /lpf    Bacteria 1+ (A) NEG /hpf    Mucus 1+ (A) NEG /lpf   DRUG SCREEN, URINE    Collection Time: 08/02/22 11:20 PM   Result Value Ref Range    BENZODIAZEPINES Negative NEG      BARBITURATES Negative NEG      THC (TH-CANNABINOL) Negative NEG      OPIATES Positive (A) NEG      PCP(PHENCYCLIDINE) Negative NEG      COCAINE Negative NEG      AMPHETAMINES Negative NEG      METHADONE Negative NEG      HDSCOM (NOTE)    HCG URINE, QL    Collection Time: 08/02/22 11:20 PM   Result Value Ref Range    HCG urine, QL Negative NEG     HGB & HCT    Collection Time: 08/03/22  4:30 AM   Result Value Ref Range    HGB 10.1 (L) 12.0 - 16.0 g/dL    HCT 30.0 (L) 35.0 - 45.0 %   GLUCOSE, POC    Collection Time: 08/03/22  9:51 AM   Result Value Ref Range    Glucose (POC) 163 (H) 70 - 370 mg/dL   METABOLIC PANEL, COMPREHENSIVE    Collection Time: 08/03/22  2:50 PM   Result Value Ref Range    Sodium 144 136 - 145 mmol/L    Potassium 3.9 3.5 - 5.5 mmol/L    Chloride 111 100 - 111 mmol/L    CO2 25 21 - 32 mmol/L    Anion gap 8 3.0 - 18 mmol/L    Glucose 198 (H) 74 - 99 mg/dL    BUN 14 7.0 - 18 MG/DL    Creatinine 0.99 0.6 - 1.3 MG/DL    BUN/Creatinine ratio 14 12 - 20      GFR est AA >60 >60 ml/min/1.73m2    GFR est non-AA 57 (L) >60 ml/min/1.73m2    Calcium 8.3 (L) 8.5 - 10.1 MG/DL    Bilirubin, total 0.6 0.2 - 1.0 MG/DL    ALT (SGPT) 22 13 - 56 U/L    AST (SGOT) 24 10 - 38 U/L    Alk.  phosphatase 119 (H) 45 - 117 U/L    Protein, total 6.7 6.4 - 8.2 g/dL    Albumin 2.7 (L) 3.4 - 5.0 g/dL    Globulin 4.0 2.0 - 4.0 g/dL    A-G Ratio 0.7 (L) 0.8 - 1.7     CBC WITH AUTOMATED DIFF    Collection Time: 08/03/22  2:50 PM   Result Value Ref Range    WBC 3.6 (L) 4.6 - 13.2 K/uL    RBC 3.75 (L) 4.20 - 5.30 M/uL    HGB 10.4 (L) 12.0 - 16.0 g/dL    HCT 32.4 (L) 35.0 - 45.0 %    MCV 86.4 78.0 - 100.0 FL    MCH 27.7 24.0 - 34.0 PG    MCHC 32.1 31.0 - 37.0 g/dL    RDW 14.5 11.6 - 14.5 %    PLATELET 49 (L) 426 - 420 K/uL    MPV 12.0 (H) 9.2 - 11.8 FL    NRBC 0.0 0  WBC    ABSOLUTE NRBC 0.00 0.00 - 0.01 K/uL    NEUTROPHILS 61 40 - 73 %    LYMPHOCYTES 28 21 - 52 %    MONOCYTES 9 3 - 10 %    EOSINOPHILS 2 0 - 5 %    BASOPHILS 0 0 - 2 %    IMMATURE GRANULOCYTES 0 0.0 - 0.5 %    ABS. NEUTROPHILS 2.2 1.8 - 8.0 K/UL    ABS. LYMPHOCYTES 1.0 0.9 - 3.6 K/UL    ABS. MONOCYTES 0.3 0.05 - 1.2 K/UL    ABS. EOSINOPHILS 0.1 0.0 - 0.4 K/UL    ABS. BASOPHILS 0.0 0.0 - 0.1 K/UL    ABS. IMM. GRANS. 0.0 0.00 - 0.04 K/UL    DF AUTOMATED      PLATELET COMMENTS DECREASED PLATELETS      RBC COMMENTS NORMOCYTIC, NORMOCHROMIC     METABOLIC PANEL, COMPREHENSIVE    Collection Time: 08/04/22 12:32 AM   Result Value Ref Range    Sodium 144 136 - 145 mmol/L    Potassium 3.8 3.5 - 5.5 mmol/L    Chloride 112 (H) 100 - 111 mmol/L    CO2 25 21 - 32 mmol/L    Anion gap 7 3.0 - 18 mmol/L    Glucose 165 (H) 74 - 99 mg/dL    BUN 10 7.0 - 18 MG/DL    Creatinine 0.89 0.6 - 1.3 MG/DL    BUN/Creatinine ratio 11 (L) 12 - 20      GFR est AA >60 >60 ml/min/1.73m2    GFR est non-AA >60 >60 ml/min/1.73m2    Calcium 8.5 8.5 - 10.1 MG/DL    Bilirubin, total 0.8 0.2 - 1.0 MG/DL    ALT (SGPT) 24 13 - 56 U/L    AST (SGOT) 23 10 - 38 U/L    Alk.  phosphatase 122 (H) 45 - 117 U/L    Protein, total 7.1 6.4 - 8.2 g/dL    Albumin 2.9 (L) 3.4 - 5.0 g/dL    Globulin 4.2 (H) 2.0 - 4.0 g/dL    A-G Ratio 0.7 (L) 0.8 - 1.7     CBC WITH AUTOMATED DIFF    Collection Time: 08/04/22 12:32 AM   Result Value Ref Range    WBC 2.7 (L) 4.6 - 13.2 K/uL    RBC 3.63 (L) 4.20 - 5.30 M/uL    HGB 10.2 (L) 12.0 - 16.0 g/dL    HCT 30.7 (L) 35.0 - 45.0 %    MCV 84.6 78.0 - 100.0 FL    MCH 28.1 24.0 - 34.0 PG    MCHC 33.2 31.0 - 37.0 g/dL    RDW 14.2 11.6 - 14.5 %    PLATELET  816 - 675 K/uL     UNABLE TO REPORT ACCURATE COUNT DUE TO PLATELET AGGREGATION, HOWEVER, PLATELETS APPEAR DECREASED IN NUMBER ON SMEAR. PLEASE RESUBMIT SODIUM CITRATE (BLUE) AND EDTA (LAVENDAR) TUBES FOR HEMATOLOGICAL TESTING. MPV 13.0 (H) 9.2 - 11.8 FL    NRBC 0.0 0  WBC    ABSOLUTE NRBC 0.00 0.00 - 0.01 K/uL    NEUTROPHILS 60 40 - 73 %    LYMPHOCYTES 28 21 - 52 %    MONOCYTES 8 3 - 10 %    EOSINOPHILS 4 0 - 5 %    BASOPHILS 0 0 - 2 %    IMMATURE GRANULOCYTES 0 0.0 - 0.5 %    ABS. NEUTROPHILS 1.6 (L) 1.8 - 8.0 K/UL    ABS. LYMPHOCYTES 0.8 (L) 0.9 - 3.6 K/UL    ABS. MONOCYTES 0.2 0.05 - 1.2 K/UL    ABS. EOSINOPHILS 0.1 0.0 - 0.4 K/UL    ABS. BASOPHILS 0.0 0.0 - 0.1 K/UL    ABS. IMM. GRANS. 0.0 0.00 - 0.04 K/UL    DF AUTOMATED      PLATELET COMMENTS CLUMPED PLATELETS      RBC COMMENTS NORMOCYTIC, NORMOCHROMIC         Signed By: Ioana Mckeon MD     August 4, 2022      I spent 25 minutes with the patient in face-to-face consultation, of which greater than 50% was spent in counseling and coordination of care as described above    Disclaimer: Sections of this note are dictated using utilizing voice recognition software. Minor typographical errors may be present. If questions arise, please do not hesitate to contact me or call our department.

## 2022-08-05 NOTE — PROGRESS NOTES
Discharge/Transition Planning     0900: Called Ortiz at [facility] and asked for clarification on whether patient can return there or not and if not able to return , patients belongings need to be brought here. Greg May stated he has been out of town and heading to facility now. Greg May will speak with staff at facility and get some information and call CM back    1000: Greg May requested clincals be faxed to them at [phone] and will see if any way can be accepted back. Notified Sheychristiano Villaseñorer will fax after IDR      1115: CM faxed last MD noted and AVS to [facility]     1300: Spoke with Greg May.  [facility] is not accepting back , but [facility] and [facility] with other [facility]. [facility] is coming to get her with belongings and taking her to [facility] [place]

## 2022-08-05 NOTE — PROGRESS NOTES
Discharge instructions reviewed with patient, all questions answered. PIV and tele removed. Patient brought down via wheelchair for discharge with [facility].

## 2022-09-17 ENCOUNTER — APPOINTMENT (OUTPATIENT)
Dept: ULTRASOUND IMAGING | Age: 62
End: 2022-09-17
Attending: STUDENT IN AN ORGANIZED HEALTH CARE EDUCATION/TRAINING PROGRAM

## 2022-09-17 ENCOUNTER — HOSPITAL ENCOUNTER (EMERGENCY)
Age: 62
Discharge: HOME | End: 2022-09-17
Attending: EMERGENCY MEDICINE

## 2022-09-17 VITALS
OXYGEN SATURATION: 100 % | DIASTOLIC BLOOD PRESSURE: 74 MMHG | SYSTOLIC BLOOD PRESSURE: 125 MMHG | TEMPERATURE: 98.1 F | HEART RATE: 85 BPM | RESPIRATION RATE: 18 BRPM

## 2022-09-17 DIAGNOSIS — R10.11 ABDOMINAL PAIN, RIGHT UPPER QUADRANT: Primary | ICD-10-CM

## 2022-09-17 LAB
ALBUMIN SERPL-MCNC: 3.5 G/DL (ref 3.4–5)
ALBUMIN/GLOB SERPL: 0.7 {RATIO} (ref 0.8–1.7)
ALP SERPL-CCNC: 168 U/L (ref 45–117)
ALT SERPL-CCNC: 18 U/L (ref 13–56)
ANION GAP SERPL CALC-SCNC: 6 MMOL/L (ref 3–18)
AST SERPL-CCNC: 28 U/L (ref 10–38)
BASOPHILS # BLD: 0 K/UL (ref 0–0.1)
BASOPHILS NFR BLD: 0 % (ref 0–2)
BILIRUB DIRECT SERPL-MCNC: 0.3 MG/DL (ref 0–0.2)
BILIRUB SERPL-MCNC: 0.7 MG/DL (ref 0.2–1)
BUN SERPL-MCNC: 9 MG/DL (ref 7–18)
BUN/CREAT SERPL: 9 (ref 12–20)
CALCIUM SERPL-MCNC: 9.6 MG/DL (ref 8.5–10.1)
CHLORIDE SERPL-SCNC: 110 MMOL/L (ref 100–111)
CO2 SERPL-SCNC: 27 MMOL/L (ref 21–32)
CREAT SERPL-MCNC: 0.95 MG/DL (ref 0.6–1.3)
DIFFERENTIAL METHOD BLD: ABNORMAL
EOSINOPHIL # BLD: 0.1 K/UL (ref 0–0.4)
EOSINOPHIL NFR BLD: 2 % (ref 0–5)
ERYTHROCYTE [DISTWIDTH] IN BLOOD BY AUTOMATED COUNT: 14.7 % (ref 11.6–14.5)
GLOBULIN SER CALC-MCNC: 4.8 G/DL (ref 2–4)
GLUCOSE SERPL-MCNC: 98 MG/DL (ref 74–99)
HCT VFR BLD AUTO: 36.3 % (ref 35–45)
HGB BLD-MCNC: 12 G/DL (ref 12–16)
IMM GRANULOCYTES # BLD AUTO: 0 K/UL (ref 0–0.04)
IMM GRANULOCYTES NFR BLD AUTO: 1 % (ref 0–0.5)
LACTATE BLD-SCNC: 1.56 MMOL/L (ref 0.4–2)
LIPASE SERPL-CCNC: 139 U/L (ref 73–393)
LYMPHOCYTES # BLD: 0.9 K/UL (ref 0.9–3.6)
LYMPHOCYTES NFR BLD: 20 % (ref 21–52)
MCH RBC QN AUTO: 26.2 PG (ref 24–34)
MCHC RBC AUTO-ENTMCNC: 33.1 G/DL (ref 31–37)
MCV RBC AUTO: 79.3 FL (ref 78–100)
MONOCYTES # BLD: 0.5 K/UL (ref 0.05–1.2)
MONOCYTES NFR BLD: 11 % (ref 3–10)
NEUTS SEG # BLD: 3 K/UL (ref 1.8–8)
NEUTS SEG NFR BLD: 67 % (ref 40–73)
NRBC # BLD: 0 K/UL (ref 0–0.01)
NRBC BLD-RTO: 0 PER 100 WBC
PLATELET # BLD AUTO: ABNORMAL K/UL (ref 135–420)
POTASSIUM SERPL-SCNC: 3.8 MMOL/L (ref 3.5–5.5)
PROT SERPL-MCNC: 8.3 G/DL (ref 6.4–8.2)
RBC # BLD AUTO: 4.58 M/UL (ref 4.2–5.3)
SODIUM SERPL-SCNC: 143 MMOL/L (ref 136–145)
WBC # BLD AUTO: 4.5 K/UL (ref 4.6–13.2)

## 2022-09-17 PROCEDURE — 83605 ASSAY OF LACTIC ACID: CPT

## 2022-09-17 PROCEDURE — 74011250636 HC RX REV CODE- 250/636: Performed by: STUDENT IN AN ORGANIZED HEALTH CARE EDUCATION/TRAINING PROGRAM

## 2022-09-17 PROCEDURE — 96375 TX/PRO/DX INJ NEW DRUG ADDON: CPT

## 2022-09-17 PROCEDURE — 83690 ASSAY OF LIPASE: CPT

## 2022-09-17 PROCEDURE — 96374 THER/PROPH/DIAG INJ IV PUSH: CPT

## 2022-09-17 PROCEDURE — 74011250636 HC RX REV CODE- 250/636: Performed by: EMERGENCY MEDICINE

## 2022-09-17 PROCEDURE — 80076 HEPATIC FUNCTION PANEL: CPT

## 2022-09-17 PROCEDURE — 85025 COMPLETE CBC W/AUTO DIFF WBC: CPT

## 2022-09-17 PROCEDURE — 76705 ECHO EXAM OF ABDOMEN: CPT

## 2022-09-17 PROCEDURE — 99284 EMERGENCY DEPT VISIT MOD MDM: CPT

## 2022-09-17 PROCEDURE — 80048 BASIC METABOLIC PNL TOTAL CA: CPT

## 2022-09-17 RX ORDER — KETOROLAC TROMETHAMINE 15 MG/ML
15 INJECTION, SOLUTION INTRAMUSCULAR; INTRAVENOUS ONCE
Status: COMPLETED | OUTPATIENT
Start: 2022-09-17 | End: 2022-09-17

## 2022-09-17 RX ORDER — MORPHINE SULFATE 4 MG/ML
4 INJECTION INTRAVENOUS ONCE
Status: COMPLETED | OUTPATIENT
Start: 2022-09-17 | End: 2022-09-17

## 2022-09-17 RX ORDER — ONDANSETRON 2 MG/ML
4 INJECTION INTRAMUSCULAR; INTRAVENOUS ONCE
Status: COMPLETED | OUTPATIENT
Start: 2022-09-17 | End: 2022-09-17

## 2022-09-17 RX ADMIN — KETOROLAC TROMETHAMINE 15 MG: 15 INJECTION, SOLUTION INTRAMUSCULAR; INTRAVENOUS at 19:25

## 2022-09-17 RX ADMIN — ONDANSETRON 4 MG: 2 INJECTION INTRAMUSCULAR; INTRAVENOUS at 17:18

## 2022-09-17 RX ADMIN — MORPHINE SULFATE 4 MG: 4 INJECTION INTRAVENOUS at 17:17

## 2022-09-17 NOTE — ED PROVIDER NOTES
EMERGENCY DEPARTMENT HISTORY AND PHYSICAL EXAM    5:11 PM      Date: 9/17/2022  Patient Name: Greg Alba    History of Presenting Illness     Chief Complaint   Patient presents with    Abdominal Pain         History Provided By: Patient    Location/Duration/Severity/Modifying factors   67-year-old female with history of means use disorder, hepatitis C, cirrhosis, iron abuse, bipolar disorder, esophageal varices status post EGD with variceal banding, and known cholelithiasis who presents to the emergency department with 5 days of right upper quadrant pain. Denies any alleviating or exacerbating factors other than improvement in the pain with deep breaths. Describes the pain as stabbing and radiates to her right flank. PCP: Shayne Brush MD    Current Facility-Administered Medications   Medication Dose Route Frequency Provider Last Rate Last Admin    morphine injection 4 mg  4 mg IntraVENous ONCE Rayne Rachel MD        ondansetron [facility]) injection 4 mg  4 mg IntraVENous ONCE Rayne Rachel MD         Current Outpatient Medications   Medication Sig Dispense Refill    furosemide (Lasix) 20 mg tablet 1 tab po q daily 30 Tablet 0    spironolactone (Aldactone) 100 mg tablet Take 1 Tablet by mouth in the morning.  30 Tablet 0       Past History     Past Medical History:  Past Medical History:   Diagnosis Date    Alcohol use     Bipolar 2 disorder ([facility] [address]     Hepatitis C     Heroin abuse ([facility] [address]     PTSD (post-traumatic stress disorder)        Past Surgical History:  Past Surgical History:   Procedure Laterality Date    HX OTHER SURGICAL  08/2020    EGD with variceal banding       Family History:  Family History   Problem Relation Age of Onset    Heart Disease Mother     Cancer Father        Social History:  Social History     Tobacco Use    Smoking status: Every Day     Packs/day: 0.50     Years: 15.00     Pack years: 7.50     Types: Cigarettes    Smokeless tobacco: Never   Substance Use Topics    Alcohol use: Yes     Alcohol/week: 1.0 standard drink     Types: 1 Cans of beer per week    Drug use: Yes     Frequency: 4.0 times per week     Types: Heroin     Comment: inhaled       Allergies:  No Known Allergies      Review of Systems       Review of Systems   Constitutional:  Negative for chills and fever. HENT:  Negative for sore throat. Eyes:  Negative for visual disturbance. Respiratory:  Negative for shortness of breath. Cardiovascular:  Negative for chest pain. Gastrointestinal:  Positive for abdominal pain. Negative for diarrhea, nausea and vomiting. Genitourinary:  Negative for difficulty urinating. Musculoskeletal:  Negative for myalgias. Skin:  Negative for rash. Neurological:  Negative for headaches. Physical Exam   Visit Vitals  /74   Pulse 85   Temp 98.1 °F (36.7 °C)   Resp 18   SpO2 100%         Physical Exam  Vitals reviewed. Constitutional:       Appearance: She is well-developed. HENT:      Head: Normocephalic and atraumatic. Cardiovascular:      Rate and Rhythm: Normal rate and regular rhythm. Pulmonary:      Effort: Pulmonary effort is normal.      Breath sounds: Normal breath sounds. Abdominal:      General: Abdomen is flat. There is no distension. There are no signs of injury. Palpations: Abdomen is soft. Tenderness: There is abdominal tenderness in the right upper quadrant. There is no guarding or rebound. Positive signs include Urbina's sign. Skin:     General: Skin is warm and dry. Capillary Refill: Capillary refill takes less than 2 seconds. Neurological:      General: No focal deficit present. Mental Status: She is alert and oriented to person, place, and time.          Diagnostic Study Results     Labs -  Recent Results (from the past 12 hour(s))   CBC WITH AUTOMATED DIFF    Collection Time: 09/17/22  3:17 PM   Result Value Ref Range    WBC 4.5 (L) 4.6 - 13.2 K/uL    RBC 4.58 4.20 - 5.30 M/uL    HGB 12.0 12.0 - 16.0 g/dL    HCT 36.3 35.0 - 45.0 %    MCV 79.3 78.0 - 100.0 FL    MCH 26.2 24.0 - 34.0 PG    MCHC 33.1 31.0 - 37.0 g/dL    RDW 14.7 (H) 11.6 - 14.5 %    PLATELET  377 - 827 K/uL     UNABLE TO REPORT ACCURATE COUNT DUE TO PLATELET AGGREGATION, HOWEVER, PLATELETS APPEAR DECREASED IN NUMBER ON SMEAR. PLEASE RESUBMIT SODIUM CITRATE (BLUE) AND EDTA (LAVENDAR) TUBES FOR HEMATOLOGICAL TESTING. NRBC 0.0 0  WBC    ABSOLUTE NRBC 0.00 0.00 - 0.01 K/uL    NEUTROPHILS 67 40 - 73 %    LYMPHOCYTES 20 (L) 21 - 52 %    MONOCYTES 11 (H) 3 - 10 %    EOSINOPHILS 2 0 - 5 %    BASOPHILS 0 0 - 2 %    IMMATURE GRANULOCYTES 1 (H) 0.0 - 0.5 %    ABS. NEUTROPHILS 3.0 1.8 - 8.0 K/UL    ABS. LYMPHOCYTES 0.9 0.9 - 3.6 K/UL    ABS. MONOCYTES 0.5 0.05 - 1.2 K/UL    ABS. EOSINOPHILS 0.1 0.0 - 0.4 K/UL    ABS. BASOPHILS 0.0 0.0 - 0.1 K/UL    ABS. IMM. GRANS. 0.0 0.00 - 0.04 K/UL    DF AUTOMATED     METABOLIC PANEL, BASIC    Collection Time: 09/17/22  3:17 PM   Result Value Ref Range    Sodium 143 136 - 145 mmol/L    Potassium 3.8 3.5 - 5.5 mmol/L    Chloride 110 100 - 111 mmol/L    CO2 27 21 - 32 mmol/L    Anion gap 6 3.0 - 18 mmol/L    Glucose 98 74 - 99 mg/dL    BUN 9 7.0 - 18 MG/DL    Creatinine 0.95 0.6 - 1.3 MG/DL    BUN/Creatinine ratio 9 (L) 12 - 20      GFR est AA >60 >60 ml/min/1.73m2    GFR est non-AA 59 (L) >60 ml/min/1.73m2    Calcium 9.6 8.5 - 10.1 MG/DL   HEPATIC FUNCTION PANEL    Collection Time: 09/17/22  3:17 PM   Result Value Ref Range    Protein, total 8.3 (H) 6.4 - 8.2 g/dL    Albumin 3.5 3.4 - 5.0 g/dL    Globulin 4.8 (H) 2.0 - 4.0 g/dL    A-G Ratio 0.7 (L) 0.8 - 1.7      Bilirubin, total 0.7 0.2 - 1.0 MG/DL    Bilirubin, direct 0.3 (H) 0.0 - 0.2 MG/DL    Alk.  phosphatase 168 (H) 45 - 117 U/L    AST (SGOT) 28 10 - 38 U/L    ALT (SGPT) 18 13 - 56 U/L   LIPASE    Collection Time: 09/17/22  3:17 PM   Result Value Ref Range    Lipase 139 73 - 393 U/L   POC LACTIC ACID    Collection Time: 09/17/22  3:45 PM   Result Value Ref Range    Lactic Acid (POC) 1.56 0.40 - 2.00 mmol/L       Radiologic Studies -   [facility]    (Results Pending)         Medical Decision Making   I am the first provider for this patient. I reviewed the vital signs, available nursing notes, past medical history, past surgical history, family history and social history. Vital Signs-Reviewed the patient's vital signs. EKG: N/A    Records Reviewed: Nursing Notes, Old Medical Records, Previous Radiology Studies, and Previous Laboratory Studies (Time of Review: 5:11 PM)    ED Course: Progress Notes, Reevaluation, and Consults:         Provider Notes (Medical Decision Making):   MDM  Number of Diagnoses or Management Options  Diagnosis management comments: 67-year-old female with known history of cholelithiasis who presents to the emergency department with right upper quadrant pain. CBC is significant for white blood cell count of 4.5. BMP is largely unremarkable. Lipase of 139. Hepatic function panel significant for protein of 3.8, globulin to 4.8. Direct bilirubin 0.3 and alk phos of 168. Choliesysitistis it the most concerning diagnosis on patients current difeerential right upper quadrant ultrasound was ordered and is pending. Patient will be sent on to Dr. Arias Ramires with disposition pending right upper quadrant ultrasound. Procedures    Critical Care Time: None      Diagnosis     Clinical Impression: No diagnosis found. Disposition: Pending    Follow-up Information    None          Patient's Medications   Start Taking    No medications on file   Continue Taking    FUROSEMIDE (LASIX) 20 MG TABLET    1 tab po q daily    SPIRONOLACTONE (ALDACTONE) 100 MG TABLET    Take 1 Tablet by mouth in the morning. These Medications have changed    No medications on file   Stop Taking    No medications on file     Disclaimer: Sections of this note are dictated using utilizing voice recognition software.   Minor typographical errors may be present. If questions arise, please do not hesitate to contact me or call our department.

## 2022-09-17 NOTE — ED NOTES
7:50 PM  09/17/22     Discharge instructions given to patient (name) with verbalization of understanding. Patient accompanied by self. Patient discharged with the following prescriptions none. Patient discharged to home (destination).       Genevieve Leyden, RN

## 2022-09-17 NOTE — ED PROVIDER NOTES
Assumed care of patient from Dr. Jairo Stock. 69-year-old female with history of cholelithiasis presenting with right upper quadrant pain. Labs reassuring with isolated elevated alk phos, normal lipase, no leukocytosis, and normal lactate. Pending RUQUS to rule out cholecystitis. Ultrasound with cholelithiasis, no secondary signs of cholecystitis. Reviewed labs. Has mild direct bili elevation without leukocytosis or renal dysfunction. He was given Toradol in the ER with improvement in symptoms. Desired to go home. Appropriate for discharge with outpatient follow-up.     Roberth Perdomo MD  PGY-4, Emergency Medicine

## 2024-12-30 NOTE — PROGRESS NOTES
Bedside shift report received from [facility]. Patient awake resting in bed eating a snack.  NAD Per Rashid patient needs Rx of Macrobid 100 mg x 5 days.     Patient requesting Rx to be sent to [facility] pharmacy.

## (undated) DEVICE — MEDI-VAC NON-CONDUCTIVE SUCTION TUBING: Brand: CARDINAL HEALTH

## (undated) DEVICE — MULTIPLE BAND LIGATOR: Brand: SPEEDBAND SUPERVIEW SUPER 7

## (undated) DEVICE — BASIN EMSIS 16OZ GRAPHITE PLAS KID SHP MOLD GRAD FOR ORAL

## (undated) DEVICE — FLUFF AND POLYMER UNDERPAD,EXTRA HEAVY: Brand: WINGS

## (undated) DEVICE — ENDOSCOPY PUMP TUBING/ CAP SET: Brand: ERBE

## (undated) DEVICE — SYR 50ML SLIP TIP NSAF LF STRL --

## (undated) DEVICE — GAUZE,SPONGE,4"X4",16PLY,STRL,LF,10/TRAY: Brand: MEDLINE

## (undated) DEVICE — CANNULA NSL AD TBNG L14FT STD PVC O2 CRV CONN NONFLARED NSL

## (undated) DEVICE — SOLUTION IRRIG 1000ML H2O STRL BLT

## (undated) DEVICE — SYRINGE MED 25GA 3ML L5/8IN SUBQ PLAS W/ DETACH NDL SFTY

## (undated) DEVICE — LINER SUCT CANSTR 3000CC PLAS SFT PRE ASSEMB W/OUT TBNG W/

## (undated) DEVICE — YANKAUER,SMOOTH HANDLE,HIGH CAPACITY: Brand: MEDLINE INDUSTRIES, INC.

## (undated) DEVICE — BITE BLOCK ENDOSCP UNIV AD 6 TO 9.4 MM

## (undated) DEVICE — STERILE POLYISOPRENE POWDER-FREE SURGICAL GLOVES: Brand: PROTEXIS

## (undated) DEVICE — CATHETER SUCT TR FL TIP 14FR W/ O CTRL